# Patient Record
Sex: MALE | Race: WHITE | Employment: OTHER | URBAN - METROPOLITAN AREA
[De-identification: names, ages, dates, MRNs, and addresses within clinical notes are randomized per-mention and may not be internally consistent; named-entity substitution may affect disease eponyms.]

---

## 2019-03-06 ENCOUNTER — OFFICE VISIT (OUTPATIENT)
Dept: SURGERY | Age: 64
End: 2019-03-06

## 2019-03-06 VITALS
BODY MASS INDEX: 22.08 KG/M2 | OXYGEN SATURATION: 100 % | WEIGHT: 163 LBS | HEART RATE: 75 BPM | HEIGHT: 72 IN | SYSTOLIC BLOOD PRESSURE: 155 MMHG | TEMPERATURE: 97.6 F | DIASTOLIC BLOOD PRESSURE: 99 MMHG

## 2019-03-06 DIAGNOSIS — K40.90 RIGHT INGUINAL HERNIA: Primary | ICD-10-CM

## 2019-03-06 DIAGNOSIS — R10.31 RIGHT GROIN PAIN: ICD-10-CM

## 2019-03-06 NOTE — PROGRESS NOTES
General Surgery Consult      Shanika Fox  Admit date: (Not on file)    MRN: [id number]     : 1955     Age: 70 y.o. Attending Physician: Michel Jimenes MD, [facility]      History of Present Illness:     Shanika Fox is a 70 y.o. male was referred for evaluation of a symptomatic right inguinal hernia. The patient has stated that he noticed a bulge about 2-3 months ago. However he stated that he has been having some burning sensation in the right groin since the summer 2017. He is stating that he has been doing a lot of heavy lifting because of his home renovation. He said that he was having some discomfort as well as pain especially with movement. He stated that the bulge is reducible. He denies any nausea or vomiting or any change in bowel habits. He stated that he has frequent urination at night and he had a prostate problem about 6 years ago. He denies any previous abdominal surgeries. Patient Active Problem List    Diagnosis Date Noted    Family hx of prostate cancer 2011     Past Medical History:   Diagnosis Date    Arthritis       History reviewed. No pertinent surgical history. Social History     Tobacco Use    Smoking status: Never Smoker    Smokeless tobacco: Never Used   Substance Use Topics    Alcohol use: No     Frequency: Never      Social History     Tobacco Use   Smoking Status Never Smoker   Smokeless Tobacco Never Used     History reviewed. No pertinent family history. Current Outpatient Medications   Medication Sig    aspirin delayed-release (ASPIR-81) 81 mg tablet Take  by mouth daily.  ibuprofen (MOTRIN) 100 mg tablet Take 100 mg by mouth every six (6) hours as needed.  aspirin (ASPIRIN) 325 mg tablet Take 325 mg by mouth daily. No current facility-administered medications for this visit.        No Known Allergies     Review of Systems:  Constitutional: negative  Eyes: negative  Ears, Nose, Mouth, Throat, and Face: negative  Respiratory: negative  Cardiovascular: negative  Gastrointestinal: positive for abdominal pain and Right groin pain and bulge  Genitourinary:negative  Integument/Breast: negative  Hematologic/Lymphatic: negative  Musculoskeletal:negative  Neurological: negative  Behavioral/Psychiatric: negative  Endocrine: negative  Allergic/Immunologic: negative    Objective:     Visit Vitals  BP (!) 155/99 (BP Patient Position: Sitting)   Pulse 75   Temp 97.6 °F (36.4 °C) (Oral)   Ht 6' (1.829 m)   Wt 73.9 kg (163 lb)   SpO2 100%   BMI 22.11 kg/m²       Physical Exam:      General:  in no apparent distress, alert, oriented times 3 and cooperative   Eyes:  conjunctivae and sclerae normal, pupils equal, round, reactive to light   Throat & Neck: no erythema or exudates noted and neck supple and symmetrical; no palpable masses   Lungs:   clear to auscultation bilaterally   Heart:  Regular rate and rhythm   Abdomen:   flat, soft, nontender, nondistended, no masses or organomegaly. there is a right inguinal hernia that is mildly tender to palpation but easily reducible.     Extremities: extremities normal, atraumatic, no cyanosis or edema   Skin: Normal.       Imaging and Lab Review:     CBC: No results found for: WBC, RBC, HGB, HCT, PLT, HGBEXT, HCTEXT, PLTEXT  BMP:   Lab Results   Component Value Date/Time    Glucose 92 04/28/2011 11:00 AM    Sodium 136 04/28/2011 11:00 AM    Potassium 4.3 04/28/2011 11:00 AM    Chloride 99 04/28/2011 11:00 AM    CO2 26 04/28/2011 11:00 AM    BUN 17 04/28/2011 11:00 AM    Creatinine 0.90 04/28/2011 11:00 AM    Calcium 9.4 04/28/2011 11:00 AM     CMP:  Lab Results   Component Value Date/Time    Glucose 92 04/28/2011 11:00 AM    Sodium 136 04/28/2011 11:00 AM    Potassium 4.3 04/28/2011 11:00 AM    Chloride 99 04/28/2011 11:00 AM    CO2 26 04/28/2011 11:00 AM    BUN 17 04/28/2011 11:00 AM    Creatinine 0.90 04/28/2011 11:00 AM    Calcium 9.4 04/28/2011 11:00 AM    BUN/Creatinine ratio 19 04/28/2011 11:00 AM       No results found for this or any previous visit (from the past 24 hour(s)). images and reports reviewed    Assessment:   Yamilex Nelson is a 70 y.o. male is presenting with a picture of symptomatic right inguinal hernia. I Discussed the possibility of incarceration, strangulation, enlargement in size over time, and the risk of emergency surgery in the face of strangulation. I also discussed the use of prosthetic materials (mesh), including the risk of infection. Also discussed the risk of surgery including recurrence and the possible need for reoperation and removal of mesh if used, possibility of postoperative small bowel injury, obstruction or ileus, and the risks of general anesthetic. I explained to the the patient about the robotic hernia repair procedure. Plan:     1. Schedule for robotic right inguinal hernia repair with placement of mesh. 2. No heavy lifting for 2 weeks after the surgery (More than 15 pounds)  3. Avoid constipation by taking stool softener.      Please call me if you have any questions (cell phone: [phone])     Signed By: Jesika Rendon MD     March 6, 2019

## 2019-03-06 NOTE — PATIENT INSTRUCTIONS
If you have any questions or concerns about today's appointment, the verbal and/or written instructions you were given for follow up care, please call our office at [phone]. [facility] [place]  [address]  [place], [address]    [phone] office  [phone] fax      PATIENT PRE AND POST [address]   [address]  [address]  [phone]    Before Surgery Instructions:   1) You must have someone available to drive you to and from your procedure and stay with you for the first 24 hours. 2) It is very important that you have nothing to eat or drink after midnight the night before your surgery. This includes chewing gum or sucking on hard candy. Take only heart, blood pressure and cholesterol medications the morning of surgery with only a sip of water. 3) Please stop taking Plavix 10 days prior to your surgery. Stop taking Coumadin 5 days prior to your surgery. Stop taking all Aspirin or Aspirin containing products 7 days prior to your surgery. Stop taking Advil, Motrin, Aleve, and etc. 3 days prior to your surgery. 4) If you take any diabetic medications please consult with your primary care physician on how to take them on the day of your surgery  5) Please stop all Herbal products 2 weeks prior to your surgery. 6) Please arrive at the hospital 2 hours prior to your surgery, unless you have been otherwise instructed. Any required labs/urine drug screen/x-rays will be performed on day of surgery. 7) If you are of child bearing age you will have pregnancy test done the morning of your surgery as soon as you arrive. 8) Patients having an operation on their colon will be given a separate instruction sheet on their Bowel Prep. 9) For any pre-operative work up check in at the main entrance to \[facility]\"", and then go to Patient Registration.  These studies are done on a walk in basis they are open from 7:00am to 5:00pm Monday through Friday. 10) Please wash your surgical site the morning of your surgery with antibacterial (i.e. Dial) soap and water. 11) You will be contacted by a hospital preadmission nurse approximately one week prior to scheduled surgery to discuss additional instructions and to review your medications. 12) You may be contacted to change your surgery time. At times this is necessary due to equipment, staffing needs or in the event of a cancellation. Surgery Date and Time:  Thursday, March 14, 2019 at 12:30pm    Please check in at [facility], enter through the Emergency Room entrance and go up to the second floor. Please check in by 10:30am the day of your surgery. After Surgery Instructions: You will need to be seen in the office for a follow-up visit 7-14 days after your surgery. Please call after you have had the procedure to make this appointment. Unless otherwise instructed, you may remove your outer bandage and shower 48 hours after your surgery. If you develop a fever greater than 101, have any significant drainage, bleeding, swelling and/or pus of the wound. Please call our office immediately. You may contact Vera Christiansen with any questions at [phone].

## 2019-03-06 NOTE — PROGRESS NOTES
Jerad Saunders is a 70 y.o. male (: 1955) presenting to address:    Chief Complaint   Patient presents with    Inguinal Hernia     right sided        Medication list and allergies have been reviewed with Jerad Saunders and updated as of today's date. I have gone over all Medical, Surgical and Social History with Jerad Saunders and updated/added the information accordingly. Pt reports that he is very active with a house that he has had built and I remodeling his old house and believes that he has over strained his groin and caused this hernia. He does not have a PCP and self referred to us. 1. Have you been to the ER, urgent care clinic since your last visit? Hospitalized since your last visit? No    2. Have you seen or consulted any other health care providers outside of the [address] [place] [address] since your last visit? Include any pap smears or colon screening.  No

## 2019-03-12 NOTE — PERIOP NOTES
PAT - SURGICAL PRE-ADMISSION INSTRUCTIONS    NAME:  Sanchez Jimenez                                                          TODAY'S DATE:  3/12/2019    SURGERY DATE:  3/14/2019                                  SURGERY ARRIVAL TIME:   TBA    1. Do NOT eat or drink anything, including candy or gum, after MIDNIGHT on 3/13/19 , unless you have specific instructions from your Surgeon or Anesthesia Provider to do so. 2. No smoking on the day of surgery. 3. No alcohol 24 hours prior to the day of surgery. 4. No recreational drugs for one week prior to the day of surgery. 5. Leave all valuables, including money/purse, at home. 6. Remove all jewelry, nail polish, makeup (including mascara); no lotions, powders, deodorant, or perfume/cologne/after shave. 7. Glasses/Contact lenses and Dentures may be worn to the hospital.  They will be removed prior to surgery. 8. Call your doctor if symptoms of a cold or illness develop within 24 ours prior to surgery. 9. AN ADULT MUST DRIVE YOU HOME AFTER OUTPATIENT SURGERY. 10. If you are having an OUTPATIENT procedure, please make arrangements for a responsible adult to be with you for 24 hours after your surgery. 11. If you are admitted to the hospital, you will be assigned to a bed after surgery is complete. Normally a family member will not be able to see you until you are in your assigned bed. 15. Family is encouraged to accompany you to the hospital.  We ask visitors in the treatment area to be limited to ONE person at a time to ensure patient privacy. EXCEPTIONS WILL BE MADE AS NEEDED. 15. Children under 12 are discouraged from entering the treatment area and need to be supervised by an adult when in the waiting room. Special Instructions:    NONE. Patient Prep:    shower with anti-bacterial soap    These surgical instructions were reviewed with PATIENT during the PAT PHONE CALL. Directions:   On the morning of surgery, please go to the [facility]. Enter the building from the [facility] entrance, 1st floor (next to the Emergency Room entrance). Take the elevator to the 2nd floor. Sign in at the Registration Desk.     If you have any questions and/or concerns, please do not hesitate to call:  (Prior to the day of surgery)  [facility] unit:  [phone]  (Day of surgery)  [facility] unit:  [phone]

## 2019-03-13 ENCOUNTER — ANESTHESIA EVENT (OUTPATIENT)
Dept: SURGERY | Age: 64
End: 2019-03-13
Payer: MEDICARE

## 2019-03-14 ENCOUNTER — HOSPITAL ENCOUNTER (OUTPATIENT)
Age: 64
Setting detail: OUTPATIENT SURGERY
Discharge: HOME | End: 2019-03-14
Attending: SURGERY | Admitting: SURGERY
Payer: MEDICARE

## 2019-03-14 ENCOUNTER — ANESTHESIA (OUTPATIENT)
Dept: SURGERY | Age: 64
End: 2019-03-14
Payer: MEDICARE

## 2019-03-14 VITALS
DIASTOLIC BLOOD PRESSURE: 79 MMHG | RESPIRATION RATE: 16 BRPM | HEART RATE: 53 BPM | TEMPERATURE: 97.3 F | WEIGHT: 160 LBS | SYSTOLIC BLOOD PRESSURE: 157 MMHG | OXYGEN SATURATION: 100 % | HEIGHT: 70 IN | BODY MASS INDEX: 22.9 KG/M2

## 2019-03-14 DIAGNOSIS — Z87.19 S/P HERNIA REPAIR: Primary | ICD-10-CM

## 2019-03-14 DIAGNOSIS — Z98.890 S/P HERNIA REPAIR: Primary | ICD-10-CM

## 2019-03-14 LAB
ATRIAL RATE: 54 BPM
CALCULATED P AXIS, ECG09: 45 DEGREES
CALCULATED R AXIS, ECG10: -12 DEGREES
CALCULATED T AXIS, ECG11: 18 DEGREES
DIAGNOSIS, 93000: NORMAL
P-R INTERVAL, ECG05: 142 MS
Q-T INTERVAL, ECG07: 406 MS
QRS DURATION, ECG06: 84 MS
QTC CALCULATION (BEZET), ECG08: 385 MS
VENTRICULAR RATE, ECG03: 54 BPM

## 2019-03-14 PROCEDURE — 77030002933 HC SUT MCRYL J&J -A: Performed by: SURGERY

## 2019-03-14 PROCEDURE — 77030035277 HC OBTRTR BLDELSS DISP INTU -B: Performed by: SURGERY

## 2019-03-14 PROCEDURE — 74011000250 HC RX REV CODE- 250: Performed by: SURGERY

## 2019-03-14 PROCEDURE — 74011000250 HC RX REV CODE- 250

## 2019-03-14 PROCEDURE — 77030032490 HC SLV COMPR SCD KNE COVD -B: Performed by: SURGERY

## 2019-03-14 PROCEDURE — 74011250636 HC RX REV CODE- 250/636: Performed by: SURGERY

## 2019-03-14 PROCEDURE — 74011250637 HC RX REV CODE- 250/637: Performed by: NURSE ANESTHETIST, CERTIFIED REGISTERED

## 2019-03-14 PROCEDURE — 74011250636 HC RX REV CODE- 250/636: Performed by: NURSE ANESTHETIST, CERTIFIED REGISTERED

## 2019-03-14 PROCEDURE — 77030020703 HC SEAL CANN DISP INTU -B: Performed by: SURGERY

## 2019-03-14 PROCEDURE — 77030018842 HC SOL IRR SOD CL 9% BAXT -A: Performed by: SURGERY

## 2019-03-14 PROCEDURE — 76210000021 HC REC RM PH II 0.5 TO 1 HR: Performed by: SURGERY

## 2019-03-14 PROCEDURE — 76060000032 HC ANESTHESIA 0.5 TO 1 HR: Performed by: SURGERY

## 2019-03-14 PROCEDURE — C1781 MESH (IMPLANTABLE): HCPCS | Performed by: SURGERY

## 2019-03-14 PROCEDURE — 76210000017 HC OR PH I REC 1.5 TO 2 HR: Performed by: SURGERY

## 2019-03-14 PROCEDURE — 93005 ELECTROCARDIOGRAM TRACING: CPT

## 2019-03-14 PROCEDURE — 77030010507 HC ADH SKN DERMBND J&J -B: Performed by: SURGERY

## 2019-03-14 PROCEDURE — 74011000272 HC RX REV CODE- 272: Performed by: SURGERY

## 2019-03-14 PROCEDURE — 76010000933 HC OR TIME 0.5 TO 1HR INTENSV - TIER 2: Performed by: SURGERY

## 2019-03-14 PROCEDURE — 74011250636 HC RX REV CODE- 250/636

## 2019-03-14 PROCEDURE — 77030031139 HC SUT VCRL2 J&J -A: Performed by: SURGERY

## 2019-03-14 DEVICE — LAPAROSCOPIC SELF-FIXATING MESH POLYESTER WITH POLYLACTIC ACID GRIPS AND COLLAGEN FILM
Type: IMPLANTABLE DEVICE | Site: INGUINAL | Status: FUNCTIONAL
Brand: PROGRIP

## 2019-03-14 RX ORDER — FENTANYL CITRATE 50 UG/ML
25 INJECTION, SOLUTION INTRAMUSCULAR; INTRAVENOUS AS NEEDED
Status: DISCONTINUED | OUTPATIENT
Start: 2019-03-14 | End: 2019-03-14 | Stop reason: HOSPADM

## 2019-03-14 RX ORDER — HYDROMORPHONE HYDROCHLORIDE 2 MG/ML
0.5 INJECTION, SOLUTION INTRAMUSCULAR; INTRAVENOUS; SUBCUTANEOUS
Status: DISCONTINUED | OUTPATIENT
Start: 2019-03-14 | End: 2019-03-14 | Stop reason: HOSPADM

## 2019-03-14 RX ORDER — BUPIVACAINE HYDROCHLORIDE AND EPINEPHRINE 5; 5 MG/ML; UG/ML
INJECTION, SOLUTION EPIDURAL; INTRACAUDAL; PERINEURAL AS NEEDED
Status: DISCONTINUED | OUTPATIENT
Start: 2019-03-14 | End: 2019-03-14 | Stop reason: HOSPADM

## 2019-03-14 RX ORDER — PROPOFOL 10 MG/ML
INJECTION, EMULSION INTRAVENOUS AS NEEDED
Status: DISCONTINUED | OUTPATIENT
Start: 2019-03-14 | End: 2019-03-14 | Stop reason: HOSPADM

## 2019-03-14 RX ORDER — KETOROLAC TROMETHAMINE 30 MG/ML
INJECTION, SOLUTION INTRAMUSCULAR; INTRAVENOUS AS NEEDED
Status: DISCONTINUED | OUTPATIENT
Start: 2019-03-14 | End: 2019-03-14 | Stop reason: HOSPADM

## 2019-03-14 RX ORDER — INSULIN LISPRO 100 [IU]/ML
INJECTION, SOLUTION INTRAVENOUS; SUBCUTANEOUS ONCE
Status: DISCONTINUED | OUTPATIENT
Start: 2019-03-14 | End: 2019-03-14 | Stop reason: HOSPADM

## 2019-03-14 RX ORDER — ONDANSETRON 2 MG/ML
4 INJECTION INTRAMUSCULAR; INTRAVENOUS ONCE
Status: COMPLETED | OUTPATIENT
Start: 2019-03-14 | End: 2019-03-14

## 2019-03-14 RX ORDER — FAMOTIDINE 20 MG/1
20 TABLET, FILM COATED ORAL ONCE
Status: COMPLETED | OUTPATIENT
Start: 2019-03-14 | End: 2019-03-14

## 2019-03-14 RX ORDER — HYDROMORPHONE HYDROCHLORIDE 1 MG/ML
INJECTION, SOLUTION INTRAMUSCULAR; INTRAVENOUS; SUBCUTANEOUS AS NEEDED
Status: DISCONTINUED | OUTPATIENT
Start: 2019-03-14 | End: 2019-03-14 | Stop reason: HOSPADM

## 2019-03-14 RX ORDER — OXYCODONE AND ACETAMINOPHEN 5; 325 MG/1; MG/1
1 TABLET ORAL
Qty: 24 TAB | Refills: 0 | Status: SHIPPED | OUTPATIENT
Start: 2019-03-14 | End: 2019-03-17

## 2019-03-14 RX ORDER — SODIUM CHLORIDE 0.9 % (FLUSH) 0.9 %
5-40 SYRINGE (ML) INJECTION AS NEEDED
Status: DISCONTINUED | OUTPATIENT
Start: 2019-03-14 | End: 2019-03-14 | Stop reason: HOSPADM

## 2019-03-14 RX ORDER — SUCCINYLCHOLINE CHLORIDE 20 MG/ML
INJECTION INTRAMUSCULAR; INTRAVENOUS AS NEEDED
Status: DISCONTINUED | OUTPATIENT
Start: 2019-03-14 | End: 2019-03-14 | Stop reason: HOSPADM

## 2019-03-14 RX ORDER — NEOSTIGMINE METHYLSULFATE 5 MG/5 ML
SYRINGE (ML) INTRAVENOUS AS NEEDED
Status: DISCONTINUED | OUTPATIENT
Start: 2019-03-14 | End: 2019-03-14 | Stop reason: HOSPADM

## 2019-03-14 RX ORDER — ONDANSETRON 2 MG/ML
INJECTION INTRAMUSCULAR; INTRAVENOUS AS NEEDED
Status: DISCONTINUED | OUTPATIENT
Start: 2019-03-14 | End: 2019-03-14 | Stop reason: HOSPADM

## 2019-03-14 RX ORDER — DEXAMETHASONE SODIUM PHOSPHATE 4 MG/ML
INJECTION, SOLUTION INTRA-ARTICULAR; INTRALESIONAL; INTRAMUSCULAR; INTRAVENOUS; SOFT TISSUE AS NEEDED
Status: DISCONTINUED | OUTPATIENT
Start: 2019-03-14 | End: 2019-03-14 | Stop reason: HOSPADM

## 2019-03-14 RX ORDER — LIDOCAINE HYDROCHLORIDE 20 MG/ML
INJECTION, SOLUTION EPIDURAL; INFILTRATION; INTRACAUDAL; PERINEURAL AS NEEDED
Status: DISCONTINUED | OUTPATIENT
Start: 2019-03-14 | End: 2019-03-14 | Stop reason: HOSPADM

## 2019-03-14 RX ORDER — SODIUM CHLORIDE, SODIUM LACTATE, POTASSIUM CHLORIDE, CALCIUM CHLORIDE 600; 310; 30; 20 MG/100ML; MG/100ML; MG/100ML; MG/100ML
50 INJECTION, SOLUTION INTRAVENOUS CONTINUOUS
Status: DISCONTINUED | OUTPATIENT
Start: 2019-03-14 | End: 2019-03-14 | Stop reason: HOSPADM

## 2019-03-14 RX ORDER — VECURONIUM BROMIDE FOR INJECTION 1 MG/ML
INJECTION, POWDER, LYOPHILIZED, FOR SOLUTION INTRAVENOUS AS NEEDED
Status: DISCONTINUED | OUTPATIENT
Start: 2019-03-14 | End: 2019-03-14 | Stop reason: HOSPADM

## 2019-03-14 RX ORDER — MIDAZOLAM HYDROCHLORIDE 1 MG/ML
INJECTION, SOLUTION INTRAMUSCULAR; INTRAVENOUS AS NEEDED
Status: DISCONTINUED | OUTPATIENT
Start: 2019-03-14 | End: 2019-03-14 | Stop reason: HOSPADM

## 2019-03-14 RX ORDER — CEFAZOLIN SODIUM 2 G/50ML
2 SOLUTION INTRAVENOUS
Status: COMPLETED | OUTPATIENT
Start: 2019-03-14 | End: 2019-03-14

## 2019-03-14 RX ORDER — FENTANYL CITRATE 50 UG/ML
INJECTION, SOLUTION INTRAMUSCULAR; INTRAVENOUS AS NEEDED
Status: DISCONTINUED | OUTPATIENT
Start: 2019-03-14 | End: 2019-03-14 | Stop reason: HOSPADM

## 2019-03-14 RX ORDER — SODIUM CHLORIDE 0.9 % (FLUSH) 0.9 %
5-40 SYRINGE (ML) INJECTION EVERY 8 HOURS
Status: DISCONTINUED | OUTPATIENT
Start: 2019-03-14 | End: 2019-03-14 | Stop reason: HOSPADM

## 2019-03-14 RX ORDER — GLYCOPYRROLATE 0.2 MG/ML
INJECTION INTRAMUSCULAR; INTRAVENOUS AS NEEDED
Status: DISCONTINUED | OUTPATIENT
Start: 2019-03-14 | End: 2019-03-14 | Stop reason: HOSPADM

## 2019-03-14 RX ADMIN — DEXAMETHASONE SODIUM PHOSPHATE 4 MG: 4 INJECTION, SOLUTION INTRA-ARTICULAR; INTRALESIONAL; INTRAMUSCULAR; INTRAVENOUS; SOFT TISSUE at 13:07

## 2019-03-14 RX ADMIN — LIDOCAINE HYDROCHLORIDE 60 MG: 20 INJECTION, SOLUTION EPIDURAL; INFILTRATION; INTRACAUDAL; PERINEURAL at 12:55

## 2019-03-14 RX ADMIN — Medication 3 MG: at 13:25

## 2019-03-14 RX ADMIN — GLYCOPYRROLATE 0.4 MG: 0.2 INJECTION INTRAMUSCULAR; INTRAVENOUS at 13:25

## 2019-03-14 RX ADMIN — CEFAZOLIN SODIUM 2 G: 2 SOLUTION INTRAVENOUS at 12:57

## 2019-03-14 RX ADMIN — HYDROMORPHONE HYDROCHLORIDE 1 MG: 1 INJECTION, SOLUTION INTRAMUSCULAR; INTRAVENOUS; SUBCUTANEOUS at 13:00

## 2019-03-14 RX ADMIN — MIDAZOLAM HYDROCHLORIDE 2 MG: 1 INJECTION, SOLUTION INTRAMUSCULAR; INTRAVENOUS at 12:47

## 2019-03-14 RX ADMIN — FENTANYL CITRATE 100 MCG: 50 INJECTION, SOLUTION INTRAMUSCULAR; INTRAVENOUS at 12:55

## 2019-03-14 RX ADMIN — ONDANSETRON 4 MG: 2 INJECTION INTRAMUSCULAR; INTRAVENOUS at 13:09

## 2019-03-14 RX ADMIN — KETOROLAC TROMETHAMINE 30 MG: 30 INJECTION, SOLUTION INTRAMUSCULAR; INTRAVENOUS at 13:24

## 2019-03-14 RX ADMIN — SODIUM CHLORIDE, SODIUM LACTATE, POTASSIUM CHLORIDE, AND CALCIUM CHLORIDE: 600; 310; 30; 20 INJECTION, SOLUTION INTRAVENOUS at 13:33

## 2019-03-14 RX ADMIN — SUCCINYLCHOLINE CHLORIDE 100 MG: 20 INJECTION INTRAMUSCULAR; INTRAVENOUS at 12:56

## 2019-03-14 RX ADMIN — FENTANYL CITRATE 25 MCG: 50 INJECTION, SOLUTION INTRAMUSCULAR; INTRAVENOUS at 14:01

## 2019-03-14 RX ADMIN — VECURONIUM BROMIDE FOR INJECTION 3 MG: 1 INJECTION, POWDER, LYOPHILIZED, FOR SOLUTION INTRAVENOUS at 12:58

## 2019-03-14 RX ADMIN — FAMOTIDINE 20 MG: 20 TABLET ORAL at 11:01

## 2019-03-14 RX ADMIN — SODIUM CHLORIDE, SODIUM LACTATE, POTASSIUM CHLORIDE, AND CALCIUM CHLORIDE 50 ML/HR: 600; 310; 30; 20 INJECTION, SOLUTION INTRAVENOUS at 11:02

## 2019-03-14 RX ADMIN — ONDANSETRON 4 MG: 2 INJECTION INTRAMUSCULAR; INTRAVENOUS at 14:09

## 2019-03-14 RX ADMIN — PROPOFOL 150 MG: 10 INJECTION, EMULSION INTRAVENOUS at 12:55

## 2019-03-14 NOTE — DISCHARGE INSTRUCTIONS
Discharge Instructions Following Surgery    Patient: Je Beck MRN: [id number]  SSN: [id number]    YOB: 1955  Age: 75 y.o. Sex: male      Activity  · As tolerated, walking encourage, stairs are okay. · Avoid strenuous activities - no lifting anything heavier than 15 pounds till seen in the clinic. · You may shower at home after 48 hours. Diet  · Regular diet after nausea from the anesthetic has passed. Pain  · Take pain medication as directed by your doctor. Please add Aleve or Ibuprofen as needed (If no contraindications for these types of medications). ·  Call your doctor if pain is NOT relieved by medication. Dressing Care  · There is glue on the wounds. No need for any dressing care. After Anesthesia  · For the first 24 hours: DO NOT Drive, Drink alcoholic beverages, or Make important decisions. · Be aware of dizziness following anesthesia and while taking pain medication. Call your doctor if  · Excessive bleeding that does not stop after holding mild pressure over the area. · Temperature of 101 degrees F or above. · Redness,excessive swelling or bruising, and/or green or yellow, smelly discharge from incision. · If nausea and vomiting continues. Appointment date/time Follow-Up Phone Calls    · Call the office at [phone] to make your follow-up appointment in 2 weeks after the surgery (if not already set up) . Dr. Tino Franco cell phone number is [phone]. Please call me if you have any concerns or questions. DISCHARGE SUMMARY from Nurse    PATIENT INSTRUCTIONS:    After general anesthesia or intravenous sedation, for 24 hours or while taking prescription Narcotics:  · Limit your activities  · Do not drive and operate hazardous machinery  · Do not make important personal or business decisions  · Do  not drink alcoholic beverages  · If you have not urinated within 8 hours after discharge, please contact your surgeon on call.     Report the following to your surgeon:  · Excessive pain, swelling, redness or odor of or around the surgical area  · Temperature over 100.5  · Nausea and vomiting lasting longer than 4 hours or if unable to take medications  · Any signs of decreased circulation or nerve impairment to extremity: change in color, persistent  numbness, tingling, coldness or increase pain  · Any questions    What to do at Home:  Recommended activity: {discharge activity:17712}, ***    If you experience any of the following symptoms ***, please follow up with ***. *  Please give a list of your current medications to your Primary Care Provider. *  Please update this list whenever your medications are discontinued, doses are      changed, or new medications (including over-the-counter products) are added. *  Please carry medication information at all times in case of emergency situations. These are general instructions for a healthy lifestyle:    No smoking/ No tobacco products/ Avoid exposure to second hand smoke  Surgeon General's Warning:  Quitting smoking now greatly reduces serious risk to your health. Obesity, smoking, and sedentary lifestyle greatly increases your risk for illness    A healthy diet, regular physical exercise & weight monitoring are important for maintaining a healthy lifestyle    You may be retaining fluid if you have a history of heart failure or if you experience any of the following symptoms:  Weight gain of 3 pounds or more overnight or 5 pounds in a week, increased swelling in our hands or feet or shortness of breath while lying flat in bed. Please call your doctor as soon as you notice any of these symptoms; do not wait until your next office visit.     Recognize signs and symptoms of STROKE:    F-face looks uneven    A-arms unable to move or move unevenly    S-speech slurred or non-existent    T-time-call 911 as soon as signs and symptoms begin-DO NOT go       Back to bed or wait to see if you get better-TIME IS BRAIN. Warning Signs of HEART ATTACK     Call 911 if you have these symptoms:   Chest discomfort. Most heart attacks involve discomfort in the center of the chest that lasts more than a few minutes, or that goes away and comes back. It can feel like uncomfortable pressure, squeezing, fullness, or pain.  Discomfort in other areas of the upper body. Symptoms can include pain or discomfort in one or both arms, the back, neck, jaw, or stomach.  Shortness of breath with or without chest discomfort.  Other signs may include breaking out in a cold sweat, nausea, or lightheadedness. Don't wait more than five minutes to call 911 - MINUTES MATTER! Fast action can save your life. Calling 911 is almost always the fastest way to get lifesaving treatment. Emergency Medical Services staff can begin treatment when they arrive -- up to an hour sooner than if someone gets to the hospital by car. The discharge information has been reviewed with the {PATIENT PARENT GUARDIAN:23934}. The {PATIENT PARENT GUARDIAN:79748} verbalized understanding. Discharge medications reviewed with the {Dishcarge meds reviewed ZHXI:04586} and appropriate educational materials and side effects teaching were provided.   ___________________________________________________________________________________________________________________________________  Patient {UIERDKVC:90426}

## 2019-03-14 NOTE — PERIOP NOTES
Phase 2 Recovery Summary  Patient arrived to Phase 2 at   Report received from [facility]:    03/14/19 1411 03/14/19 1416 03/14/19 1443 03/14/19 1524   BP: 161/81 165/80 156/78 157/79   Pulse: (!) 50 (!) 46 (!) 48 (!) 53   Resp: 10 8 10 16   Temp:       SpO2: 100% 96% 100%    Weight:       Height:           oriented to time, place, person and situation    Lines and Drains  Peripheral Intravenous Line:      Wound  Wound Abdomen (Active)   Number of days: 0       Wound Abdomen Anterior (Active)   Dressing Status  Clean, dry, and intact 3/14/2019  3:39 PM   Dressing Type  Topical skin adhesive/glue 3/14/2019  3:39 PM   Incision site well approximated?  Yes 3/14/2019  3:39 PM   Number of days: 0          Patient discharged to home with friend    Lotus Espinosa RN

## 2019-03-14 NOTE — BRIEF OP NOTE
BRIEF OPERATIVE NOTE    Date of Procedure: 3/14/2019   Preoperative Diagnosis: Right Inguinal Hernis K40.90 (R) Groin Pain R10.31  Postoperative Diagnosis: Right Inguinal Hernis K40.90 (R) Groin Pain R10.31    Procedure(s):  rOBOTIC REPAIR OF RIGHT INGUINAL HERNIA WITH MESH  Surgeon(s) and Role:     * Tad Manuel MD - Primary  Surgical Staff:  Circ-1: Eustacio Kussmaul, RN  Circ-2: Annamarie Jose  Scrub Tech-1: Humble VÁSQUEZ  Scrub Tech-2: Chelo CHOUDHARY  Surg Asst-1: Lyle Awad  Event Time In Time Out   Incision Start 1300    Incision Close       Anesthesia: General   Estimated Blood Loss: Minimal.  Specimens: * No specimens in log *   Findings: Right indirect inguinal hernia. Complications: None. Implants:   Implant Name Type Inv.  Item Serial No.  Lot No. LRB No. Used Action   MESH ABSORB SURG PROGRIP 10X15 -- PROGRIP - [id number]  MESH ABSORB SURG PROGRIP 10X15 -- PROGRIP  COVID[employer]EN  SURGICAL [id number] Right 1 Implanted

## 2019-03-14 NOTE — PERIOP NOTES
Pre-Op Summary    Pt arrived via car with family/friend and is oriented to time, place, person and situation. Patient with steady gait with none assistive devices. Visit Vitals  /87   Pulse 65   Temp 98.4 °F (36.9 °C)   Resp 16   Ht 5' 9.75\" (1.772 m)   Wt 72.6 kg (160 lb)   SpO2 98%   BMI 23.12 kg/m²       Peripheral IV located on Right hand . Patients belongings are located locked in store room. Patient's point of contact is Poli Mcdonald and their contact number is: [phone]. They will be leaving and coming back. They are able to receive medication information. They will be their ride home.

## 2019-03-14 NOTE — PERIOP NOTES
1346 Pt received to PACU and connected to monitor. Bedside report given by Keren Gutiérrez RN. Vital signs stable. Nurse at bedside. Will continue to monitor.

## 2019-03-14 NOTE — ANESTHESIA PREPROCEDURE EVALUATION
Anesthetic History   No history of anesthetic complications            Review of Systems / Medical History  Patient summary reviewed and pertinent labs reviewed    Pulmonary                   Neuro/Psych              Cardiovascular                       GI/Hepatic/Renal                Endo/Other        Arthritis     Other Findings              Physical Exam    Airway  Mallampati: III  TM Distance: 4 - 6 cm  Neck ROM: decreased range of motion   Mouth opening: Diminished (comment)     Cardiovascular    Rhythm: regular  Rate: normal         Dental    Dentition: Poor dentition     Pulmonary  Breath sounds clear to auscultation               Abdominal  GI exam deferred       Other Findings            Anesthetic Plan    ASA: 1  Anesthesia type: general          Induction: Intravenous  Anesthetic plan and risks discussed with: Patient

## 2019-03-14 NOTE — PROGRESS NOTES
Date of Surgery Update:  Marialuisa Clemons was seen and examined. History and physical has been reviewed. The patient has been examined. There have been no significant clinical changes since the completion of the originally dated History and Physical. Will proceed with robotic right inguinal hernia repair with mesh.      Signed By: Emma Velazquez MD     March 14, 2019 10:53 AM

## 2019-03-14 NOTE — ANESTHESIA POSTPROCEDURE EVALUATION
Procedure(s):  rOBOTIC REPAIR OF RIGHT INGUINAL HERNIA WITH MESH.     Anesthesia Post Evaluation      Multimodal analgesia: multimodal analgesia used between 6 hours prior to anesthesia start to PACU discharge  Patient location during evaluation: bedside  Patient participation: complete - patient participated  Level of consciousness: awake  Pain management: adequate  Airway patency: patent  Anesthetic complications: no  Cardiovascular status: stable  Respiratory status: acceptable  Hydration status: acceptable  Post anesthesia nausea and vomiting:  controlled      Visit Vitals  /80   Pulse (!) 46   Temp 36.3 °C (97.3 °F)   Resp 8   Ht 5' 9.75\" (1.772 m)   Wt 72.6 kg (160 lb)   SpO2 96%   BMI 23.12 kg/m²

## 2019-03-15 ENCOUNTER — TELEPHONE (OUTPATIENT)
Dept: SURGERY | Age: 64
End: 2019-03-15

## 2019-03-15 ENCOUNTER — HOSPITAL ENCOUNTER (EMERGENCY)
Age: 64
Discharge: HOME | End: 2019-03-15
Attending: EMERGENCY MEDICINE
Payer: MEDICARE

## 2019-03-15 VITALS
SYSTOLIC BLOOD PRESSURE: 129 MMHG | BODY MASS INDEX: 22.9 KG/M2 | HEIGHT: 70 IN | DIASTOLIC BLOOD PRESSURE: 72 MMHG | WEIGHT: 160 LBS | RESPIRATION RATE: 18 BRPM | HEART RATE: 74 BPM | TEMPERATURE: 98.1 F | OXYGEN SATURATION: 99 %

## 2019-03-15 DIAGNOSIS — R33.9 URINARY RETENTION: Primary | ICD-10-CM

## 2019-03-15 PROCEDURE — 77030034850

## 2019-03-15 PROCEDURE — 99284 EMERGENCY DEPT VISIT MOD MDM: CPT

## 2019-03-15 PROCEDURE — 77030029179 HC BAG URIN DRNG SIMS -A

## 2019-03-15 PROCEDURE — 51702 INSERT TEMP BLADDER CATH: CPT

## 2019-03-15 NOTE — ED PROVIDER NOTES
EMERGENCY DEPARTMENT HISTORY AND PHYSICAL EXAM    6:51 AM      Date: 3/15/2019  Patient Name: Yayo Rodrigez    History of Presenting Illness     Chief Complaint   Patient presents with    Urinary Retention         History Provided By: patient  Additional History (Context): Yayo Rodrigez is a 75 y.o. male presents with    PCP: None        Surgery yesterday w Dr Sweet Man. Right laparoscopic inguinal hernia repair with mesh. Has not voided since, severe distress and urge to urinate. Tried hot shower. Feeling severe pressure and urge to void. .      Current Outpatient Medications   Medication Sig Dispense Refill    oxyCODONE-acetaminophen (PERCOCET) 5-325 mg per tablet Take 1 Tab by mouth every four (4) hours as needed for Pain for up to 3 days. Max Daily Amount: 6 Tabs. 24 Tab 0       Past History     Past Medical History:  Past Medical History:   Diagnosis Date    Arthritis     Varicose vein of leg     LEFT LEG       Past Surgical History:  Past Surgical History:   Procedure Laterality Date    HX COLONOSCOPY      HX OTHER SURGICAL  1986    sebaceous cyst removed from back       Family History:  History reviewed. No pertinent family history. Social History:  Social History     Tobacco Use    Smoking status: Never Smoker    Smokeless tobacco: Never Used   Substance Use Topics    Alcohol use: Yes     Frequency: Never     Comment: BEER OR WINE OCCAS    Drug use: No       Allergies:  No Known Allergies      Review of Systems     Review of Systems   Constitutional: Negative for diaphoresis and fever. HENT: Negative for congestion and sore throat. Eyes: Negative for pain and itching. Respiratory: Negative for cough and shortness of breath. Cardiovascular: Negative for chest pain and palpitations. Gastrointestinal: Negative for abdominal pain and diarrhea. Endocrine: Negative for polydipsia and polyuria. Genitourinary: Negative for dysuria and hematuria.    Musculoskeletal: Negative for arthralgias and myalgias. Skin: Negative for rash and wound. Neurological: Negative for seizures and syncope. Hematological: Does not bruise/bleed easily. Psychiatric/Behavioral: Negative for agitation and hallucinations. Physical Exam     Patient Vitals for the past 12 hrs:   Temp Pulse Resp BP SpO2   03/15/19 0815    135/73    03/15/19 0634 98.1 °F (36.7 °C) 76 18 (!) 168/114 97 %         Physical Exam   Constitutional: He appears well-developed and well-nourished. HENT:   Head: Normocephalic and atraumatic. Eyes: Conjunctivae are normal. No scleral icterus. Neck: Normal range of motion. Neck supple. No JVD present. Cardiovascular: Normal rate, regular rhythm and intact distal pulses. Pulmonary/Chest: Effort normal. No respiratory distress. Abdominal: There is tenderness (superpubic tenderness). Genitourinary: Testes normal and penis normal.   Musculoskeletal: Normal range of motion. Neurological: He is alert. Skin: Skin is warm and dry. Psychiatric: Judgment and thought content normal.   Nursing note and vitals reviewed. Diagnostic Study Results   Labs -  No results found for this or any previous visit (from the past 12 hour(s)). Radiologic Studies -   No orders to display     No results found. Medications ordered:   Medications - No data to display      Medical Decision Making   Initial Medical Decision Making and DDx:  Nurse attempting patel. ED Course: Progress Notes, Reevaluation, and Consults:     9:18 AM success after Patel catheter. All the patient's symptoms have resolved. Discussed with Dr. Edna Herman and routine follow-up for him. Discussed with patient and will give referral to [facility] [place]. Patel converted to leg bag. 9:19 AM Pt reevaluated at this time. Discussed results and findings, as well as, diagnosis and plan for discharge. Follow up with doctors/services listed. Return to the emergency department for alarming symptoms.   Pt verbalizes understanding and agreement with plan. All questions addressed. I am the first provider for this patient. I reviewed the vital signs, available nursing notes, past medical history, past surgical history, family history and social history. Vital Signs-Reviewed the patient's vital signs. Records Reviewed: Nursing Notes (Time of Review: 6:51 AM)      Diagnosis     Clinical Impression:   1. Urinary retention        Disposition: home    Follow-up Information     Follow up With Specialties Details Why Contact Info    [facility]  In 2 days  [address] [place] [address]  [phone]              Medication List      ASK your doctor about these medications    oxyCODONE-acetaminophen 5-325 mg per tablet  Commonly known as:  PERCOCET  Take 1 Tab by mouth every four (4) hours as needed for Pain for up to 3 days. Max Daily Amount: 6 Tabs.          _______________________________    Attestations:  Billie Gomez MD acting as a scribe for and in the presence of Kristie Negrete MD      March 15, 2019 at 9:19 AM       Provider Attestation:      I personally performed the services described in the documentation, reviewed the documentation, as recorded by the scribe in my presence, and it accurately and completely records my words and actions.  March 15, 2019 at 9:19 AM - Kristie Negrete MD    _______________________________

## 2019-03-15 NOTE — TELEPHONE ENCOUNTER
Saima Antoine called from [facility] ED to speak with Dr. Charmaine Jessica, patient in ED with c/o unable to void. Will speak with Charmaine Jessica, [address] [place] [address].

## 2019-03-15 NOTE — OP NOTES
[facility]  OPERATIVE REPORT    Name:  Niraj Stephenson  MR#:   [id number]  :  1955  ACCOUNT #:  [de-identified]  DATE OF SERVICE:  2019    PREOPERATIVE DIAGNOSIS:  Right inguinal hernia. POSTOPERATIVE DIAGNOSIS:  Right indirect inguinal hernia. PROCEDURE PERFORMED:  Robotic repair of right inguinal hernia with mesh. SURGEON:  Eric Thornton M.D.    ASSISTANT:  Dianne Beck MD    ANESTHESIA:  General.    COMPLICATIONS:  None. SPECIMENS REMOVED:  None. IMPLANTS:  Mesh. ESTIMATED BLOOD LOSS:  Minimal.    PROCEDURE:  The patient was brought to the operating room. Anesthesia was induced. Scrubbing and draping of the abdomen was done in the usual manner. A time-out was performed. A skin incision in the supraumbilical area was performed. Veress needle was inserted. The abdomen was insufflated. An 8 mm port was inserted. The abdomen was explored. There was no evidence of any hernia on the left side. There was a right indirect inguinal hernia on the right side. Two other 8-mm ports were placed under direct visualization on the right and left side of the abdominal wall. The robot was docked. The peritoneum was opened in the right groin. The preperitoneal space was dissected. The inferior epigastric vessels were identified and protected. The sac was completely reduced and the cord structures were identified and protected. The Pravin's ligament was identified. A 15 x 10 cm ProGrip Covidien mesh was placed in the preperitoneal space on the right side with the rough side towards the abdominal wall and smooth side towards the bowel. This was fixed with interrupted 3-0 Vicryl sutures, 3 of them and then the peritoneum was closed on top of the mesh with a 2-0 V-Loc suture. Hemostasis was secured, instruments were removed, robot was undocked, and the skin incisions were closed with 4-0 Monocryl.       Remberto Elizalde MD      [id number]  D:  2019 13:36  T:  03/14/2019 22:19  JOB #:  [id number]

## 2019-03-15 NOTE — ED NOTES
Report given to LEONOR POOLE [facility]. No longer assuming care of patient at this time.

## 2019-03-15 NOTE — DISCHARGE INSTRUCTIONS
Patient Education        Urinary Retention: Care Instructions  Your Care Instructions    Urinary retention means that you aren't able to urinate. In men, it is often caused by a blockage of the urinary tract from an enlarged prostate gland. In men and women, it can also be caused by an infection or nerve damage. Or it may be a side effect of a medicine. The doctor may have drained the urine from your bladder. If you still have problems passing urine, you may need to use a catheter at home. This is used to empty your bladder until the problem can be fixed. Your doctor may put a catheter in your bladder before you go home. If so, he or she will tell you when to come back to have the catheter removed. The doctor has checked you closely. But problems can develop later. If you notice any problems or new symptoms, get medical treatment right away. Follow-up care is a key part of your treatment and safety. Be sure to make and go to all appointments, and call your doctor if you are having problems. It's also a good idea to know your test results and keep a list of the medicines you take. How can you care for yourself at home? · Take your medicines exactly as prescribed. Call your doctor if you think you are having a problem with your medicine. You will get more details on the specific medicines your doctor prescribes. · Check with your doctor before you use any over-the-counter medicines. Many cold and allergy medicines, for example, can make this problem worse. Make sure your doctor knows all of the medicines, vitamins, supplements, and herbal remedies you take. · Spread out through the day the amount of fluid you drink. Do not drink a lot at bedtime. · Avoid alcohol and caffeine. · If you have been given a catheter, or if one is already in place, follow the instructions you were given. Always wash your hands before and after you handle the catheter. When should you call for help?   Call your doctor now or seek immediate medical care if:    · You cannot urinate at all, or it is getting harder to urinate.     · You have symptoms of a urinary tract infection. These may include:  ? Pain or burning when you urinate. ? A frequent need to urinate without being able to pass much urine. ? Pain in the flank, which is just below the rib cage and above the waist on either side of the back. ? Blood in your urine. ? A fever.    Watch closely for changes in your health, and be sure to contact your doctor if:    · You have any problems with your catheter.     · You do not get better as expected. Where can you learn more? Go to http://mario-basil.info/. Enter M244 in the search box to learn more about \"Urinary Retention: Care Instructions. \"  Current as of: March 20, 2018  Content Version: 11.9  © 2983-2559 [employer]. Care instructions adapted under license by [employer] (which disclaims liability or warranty for this information). If you have questions about a medical condition or this instruction, always ask your healthcare professional. [address] any warranty or liability for your use of this information.

## 2019-03-15 NOTE — ED NOTES
Patient received leg bag and patel bag teaching, follow up information, and discharge teaching well. Denies pain. Vitals stable. No acute distress noted.

## 2019-03-19 ENCOUNTER — APPOINTMENT (OUTPATIENT)
Dept: ULTRASOUND IMAGING | Age: 64
End: 2019-03-19
Attending: NURSE PRACTITIONER
Payer: MEDICARE

## 2019-03-19 ENCOUNTER — HOSPITAL ENCOUNTER (EMERGENCY)
Age: 64
Discharge: HOME | End: 2019-03-19
Attending: EMERGENCY MEDICINE | Admitting: EMERGENCY MEDICINE
Payer: MEDICARE

## 2019-03-19 ENCOUNTER — APPOINTMENT (OUTPATIENT)
Dept: CT IMAGING | Age: 64
End: 2019-03-19
Attending: NURSE PRACTITIONER
Payer: MEDICARE

## 2019-03-19 VITALS
SYSTOLIC BLOOD PRESSURE: 151 MMHG | TEMPERATURE: 98.1 F | RESPIRATION RATE: 18 BRPM | HEART RATE: 63 BPM | WEIGHT: 160 LBS | HEIGHT: 70 IN | OXYGEN SATURATION: 99 % | BODY MASS INDEX: 22.9 KG/M2 | DIASTOLIC BLOOD PRESSURE: 91 MMHG

## 2019-03-19 DIAGNOSIS — I86.1 VARICOCELE: Primary | ICD-10-CM

## 2019-03-19 DIAGNOSIS — K64.9 HEMORRHOIDS, UNSPECIFIED HEMORRHOID TYPE: ICD-10-CM

## 2019-03-19 DIAGNOSIS — G89.18 PAIN FOLLOWING SURGERY OR PROCEDURE: ICD-10-CM

## 2019-03-19 LAB
ALBUMIN SERPL-MCNC: 3.8 G/DL (ref 3.4–5)
ALBUMIN/GLOB SERPL: 0.9 {RATIO} (ref 0.8–1.7)
ALP SERPL-CCNC: 62 U/L (ref 45–117)
ALT SERPL-CCNC: 22 U/L (ref 16–61)
ANION GAP SERPL CALC-SCNC: 4 MMOL/L (ref 3–18)
APPEARANCE UR: CLEAR
AST SERPL-CCNC: 24 U/L (ref 15–37)
BACTERIA URNS QL MICRO: ABNORMAL /HPF
BASOPHILS # BLD: 0 K/UL (ref 0–0.1)
BASOPHILS NFR BLD: 0 % (ref 0–2)
BILIRUB DIRECT SERPL-MCNC: <0.1 MG/DL (ref 0–0.2)
BILIRUB SERPL-MCNC: 0.4 MG/DL (ref 0.2–1)
BILIRUB UR QL: NEGATIVE
BUN SERPL-MCNC: 23 MG/DL (ref 7–18)
BUN/CREAT SERPL: 26 (ref 12–20)
CALCIUM SERPL-MCNC: 8.8 MG/DL (ref 8.5–10.1)
CHLORIDE SERPL-SCNC: 106 MMOL/L (ref 100–108)
CO2 SERPL-SCNC: 28 MMOL/L (ref 21–32)
COLOR UR: ABNORMAL
CREAT SERPL-MCNC: 0.88 MG/DL (ref 0.6–1.3)
DIFFERENTIAL METHOD BLD: ABNORMAL
EOSINOPHIL # BLD: 0.2 K/UL (ref 0–0.4)
EOSINOPHIL NFR BLD: 2 % (ref 0–5)
EPITH CASTS URNS QL MICRO: ABNORMAL /LPF (ref 0–5)
ERYTHROCYTE [DISTWIDTH] IN BLOOD BY AUTOMATED COUNT: 12.4 % (ref 11.6–14.5)
GLOBULIN SER CALC-MCNC: 4.1 G/DL (ref 2–4)
GLUCOSE SERPL-MCNC: 89 MG/DL (ref 74–99)
GLUCOSE UR STRIP.AUTO-MCNC: NEGATIVE MG/DL
HCT VFR BLD AUTO: 41.1 % (ref 36–48)
HGB BLD-MCNC: 13.7 G/DL (ref 13–16)
HGB UR QL STRIP: ABNORMAL
KETONES UR QL STRIP.AUTO: 15 MG/DL
LEUKOCYTE ESTERASE UR QL STRIP.AUTO: ABNORMAL
LYMPHOCYTES # BLD: 1.4 K/UL (ref 0.9–3.6)
LYMPHOCYTES NFR BLD: 17 % (ref 21–52)
MCH RBC QN AUTO: 29.8 PG (ref 24–34)
MCHC RBC AUTO-ENTMCNC: 33.3 G/DL (ref 31–37)
MCV RBC AUTO: 89.5 FL (ref 74–97)
MONOCYTES # BLD: 0.6 K/UL (ref 0.05–1.2)
MONOCYTES NFR BLD: 7 % (ref 3–10)
MUCOUS THREADS URNS QL MICRO: ABNORMAL /LPF
NEUTS SEG # BLD: 5.9 K/UL (ref 1.8–8)
NEUTS SEG NFR BLD: 74 % (ref 40–73)
NITRITE UR QL STRIP.AUTO: NEGATIVE
PH UR STRIP: 6.5 [PH] (ref 5–8)
PLATELET # BLD AUTO: 277 K/UL (ref 135–420)
PMV BLD AUTO: 9 FL (ref 9.2–11.8)
POTASSIUM SERPL-SCNC: 4.6 MMOL/L (ref 3.5–5.5)
PROT SERPL-MCNC: 7.9 G/DL (ref 6.4–8.2)
PROT UR STRIP-MCNC: 30 MG/DL
RBC # BLD AUTO: 4.59 M/UL (ref 4.7–5.5)
RBC #/AREA URNS HPF: ABNORMAL /HPF (ref 0–5)
SODIUM SERPL-SCNC: 138 MMOL/L (ref 136–145)
SP GR UR REFRACTOMETRY: 1.03 (ref 1–1.03)
UROBILINOGEN UR QL STRIP.AUTO: 0.2 EU/DL (ref 0.2–1)
WBC # BLD AUTO: 8 K/UL (ref 4.6–13.2)
WBC URNS QL MICRO: ABNORMAL /HPF (ref 0–4)

## 2019-03-19 PROCEDURE — 80076 HEPATIC FUNCTION PANEL: CPT

## 2019-03-19 PROCEDURE — 74011636320 HC RX REV CODE- 636/320: Performed by: EMERGENCY MEDICINE

## 2019-03-19 PROCEDURE — 81001 URINALYSIS AUTO W/SCOPE: CPT

## 2019-03-19 PROCEDURE — 74011250637 HC RX REV CODE- 250/637: Performed by: NURSE PRACTITIONER

## 2019-03-19 PROCEDURE — 87086 URINE CULTURE/COLONY COUNT: CPT

## 2019-03-19 PROCEDURE — 76870 US EXAM SCROTUM: CPT

## 2019-03-19 PROCEDURE — 85025 COMPLETE CBC W/AUTO DIFF WBC: CPT

## 2019-03-19 PROCEDURE — 80048 BASIC METABOLIC PNL TOTAL CA: CPT

## 2019-03-19 PROCEDURE — 74177 CT ABD & PELVIS W/CONTRAST: CPT

## 2019-03-19 PROCEDURE — 99284 EMERGENCY DEPT VISIT MOD MDM: CPT

## 2019-03-19 RX ORDER — IBUPROFEN 400 MG/1
800 TABLET ORAL
Status: COMPLETED | OUTPATIENT
Start: 2019-03-19 | End: 2019-03-19

## 2019-03-19 RX ORDER — HYDROCORTISONE 25 MG/G
CREAM TOPICAL
Status: COMPLETED | OUTPATIENT
Start: 2019-03-19 | End: 2019-03-19

## 2019-03-19 RX ORDER — HYDROCORTISONE 25 MG/G
CREAM TOPICAL 4 TIMES DAILY
Qty: 30 G | Refills: 0 | Status: SHIPPED | OUTPATIENT
Start: 2019-03-19 | End: 2019-07-01

## 2019-03-19 RX ORDER — CEPHALEXIN 500 MG/1
CAPSULE ORAL
Qty: 40 CAP | Refills: 0 | Status: SHIPPED | OUTPATIENT
Start: 2019-03-19 | End: 2019-07-01

## 2019-03-19 RX ORDER — IBUPROFEN 800 MG/1
800 TABLET ORAL
Qty: 20 TAB | Refills: 0 | Status: SHIPPED | OUTPATIENT
Start: 2019-03-19 | End: 2019-03-26

## 2019-03-19 RX ORDER — OXYCODONE AND ACETAMINOPHEN 5; 325 MG/1; MG/1
1 TABLET ORAL
Status: COMPLETED | OUTPATIENT
Start: 2019-03-19 | End: 2019-03-19

## 2019-03-19 RX ADMIN — HYDROCORTISONE 2.5%: 25 CREAM TOPICAL at 11:14

## 2019-03-19 RX ADMIN — IOPAMIDOL 94 ML: 612 INJECTION, SOLUTION INTRAVENOUS at 12:13

## 2019-03-19 RX ADMIN — IBUPROFEN 800 MG: 400 TABLET ORAL at 11:14

## 2019-03-19 RX ADMIN — OXYCODONE AND ACETAMINOPHEN 1 TABLET: 5; 325 TABLET ORAL at 11:14

## 2019-03-19 NOTE — ED TRIAGE NOTES
Patient had hernia surgery and was unable to void, had patel placed on Friday, patient now with swelling and bruising to scrotum and severe hemorrhoids

## 2019-03-19 NOTE — ED PROVIDER NOTES
Kurtis Domingo is a 75year old male who presents to the ED with a c/o abdominal pain, scrotal pain and swelling, and hemorrhoid pain for the past three days. Pt had inguinal surgery repair here at [facility] with Dr Rosaura Eckert on 03-14-19. The following day, he found that he could not void. He came into the ED and a patel catheter was placed. He has an appointment with a provider at [facility] in the near future, and has not follow up with Dr Rosaura Eckert for post op care. After the patel was placed, Pt developed inflamed external hemorrhoids and pain and discoloration in the testicles. He called the Urology office, and was told to go to the ER for follow up. Pt also states he has had multiple episodes of diarrhea. Past Medical History:  
Diagnosis Date  Arthritis  Varicose vein of leg LEFT LEG Past Surgical History:  
Procedure Laterality Date  HX COLONOSCOPY    
 HX OTHER SURGICAL  1986  
 sebaceous cyst removed from back History reviewed. No pertinent family history. Social History Socioeconomic History  Marital status: SINGLE Spouse name: Not on file  Number of children: Not on file  Years of education: Not on file  Highest education level: Not on file Social Needs  Financial resource strain: Not on file  Food insecurity - worry: Not on file  Food insecurity - inability: Not on file  Transportation needs - medical: Not on file  Transportation needs - non-medical: Not on file Occupational History  Not on file Tobacco Use  Smoking status: Never Smoker  Smokeless tobacco: Never Used Substance and Sexual Activity  Alcohol use: Yes Frequency: Never Comment: BEER OR WINE OCCAS  
 Drug use: No  
 Sexual activity: Not Currently Other Topics Concern  Not on file Social History Narrative  Not on file ALLERGIES: Patient has no known allergies. Review of Systems Constitutional: Negative. HENT: Negative. Eyes: Negative. Respiratory: Negative. Cardiovascular: Negative. Gastrointestinal: Positive for abdominal pain and rectal pain (Hemorrhoid). Diarrhea Endocrine: Negative. Genitourinary: Positive for scrotal swelling and testicular pain. Inability to void Musculoskeletal: Negative. Skin: Negative. Allergic/Immunologic: Negative. Neurological: Negative. Psychiatric/Behavioral: Negative. Vitals:  
 03/19/19 0930 BP: (!) 138/100 Pulse: 95 Resp: 18 Temp: 98.1 °F (36.7 °C) SpO2: 99% Weight: 72.6 kg (160 lb) Height: 5' 9.75\" (1.772 m) Physical Exam  
Constitutional: He appears well-developed and well-nourished. No distress. HENT:  
Head: Normocephalic and atraumatic. Nose: Nose normal.  
Eyes: EOM are normal. Right eye exhibits no discharge. Left eye exhibits no discharge. No scleral icterus. Neck: Normal range of motion. Neck supple. No tracheal deviation present. Cardiovascular: Normal rate, regular rhythm and intact distal pulses. Pulmonary/Chest: Effort normal and breath sounds normal.  
Abdominal: Soft. He exhibits no distension. Genitourinary:  
Genitourinary Comments: The testicles are modestly TTP bilaterally. There is no testicular edema or masses noted. The right scrotum has ecchymosis noted. There is a patel catheter placed running to a leg bag. Multiple external non thrombosed hemorrhoids. Ranjit Raphael NP  2:36 PM 
 
   
Musculoskeletal: He exhibits no deformity. Neurological: He is alert. Coordination normal.  
Skin: Skin is warm and dry. NE. Psychiatric: He has a normal mood and affect. His behavior is normal.  
Nursing note and vitals reviewed. MDM Number of Diagnoses or Management Options Diagnosis management comments: CONSULT:    Discuss the Pt with Dr Marylou Mejia from Urology. He advised to start the Pt on Kelfex for Varicocele. Advised against pulling the catheter. Requested that we run a urine culture. Urology will try to move the appointment up. Angeles Damon NP.   2:35 PM 
 
 
  
Amount and/or Complexity of Data Reviewed Clinical lab tests: ordered and reviewed Tests in the radiology section of CPT®: ordered and reviewed Risk of Complications, Morbidity, and/or Mortality Presenting problems: moderate Diagnostic procedures: moderate Management options: moderate Patient Progress Patient progress: stable Procedures Diagnosis: 1. Varicocele 2. Pain following surgery or procedure 3. Hemorrhoids, unspecified hemorrhoid type Disposition:   Discharged to Home Follow-up Information Follow up With Specialties Details Why Contact Info Baljeet Alarcon MD Urology Call today to move appointment up   [address] [place] [address] 
[phone] Medication List  
  
START taking these medications   
cephALEXin 500 mg capsule Commonly known as:  Junius Alpers Take two caps PO in the morning, and two caps PO in the evening for ten days. hydrocortisone 2.5 % rectal cream 
Commonly known as:  ANUSOL-HC Insert  into rectum four (4) times daily. ibuprofen 800 mg tablet Commonly known as:  MOTRIN Take 1 Tab by mouth every eight (8) hours as needed for Pain for up to 7 days. Where to Get Your Medications Information about where to get these medications is not yet available Ask your nurse or doctor about these medications · cephALEXin 500 mg capsule · hydrocortisone 2.5 % rectal cream 
· ibuprofen 800 mg tablet

## 2019-03-19 NOTE — DISCHARGE INSTRUCTIONS
[employer] Activation    Thank you for requesting access to [employer]. Please follow the instructions below to securely access and download your online medical record. [employer] allows you to send messages to your doctor, view your test results, renew your prescriptions, schedule appointments, and more. How Do I Sign Up? 1. In your internet browser, go to [email or web address]  2. Click on the First Time User? Click Here link in the Sign In box. You will be redirect to the New Member Sign Up page. 3. Enter your [employer] Access Code exactly as it appears below. You will not need to use this code after youve completed the sign-up process. If you do not sign up before the expiration date, you must request a new code. [employer] Access Code: Activation code not generated  Current [employer] Status: Active (This is the date your [employer] access code will )    4. Enter the last four digits of your Social Security Number (xxxx) and Date of Birth (mm/dd/yyyy) as indicated and click Submit. You will be taken to the next sign-up page. 5. Create a [employer] ID. This will be your [employer] login ID and cannot be changed, so think of one that is secure and easy to remember. 6. Create a [employer] password. You can change your password at any time. 7. Enter your Password Reset Question and Answer. This can be used at a later time if you forget your password. 8. Enter your e-mail address. You will receive e-mail notification when new information is available in [address] 9. Click Sign Up. You can now view and download portions of your medical record. 10. Click the Download Summary menu link to download a portable copy of your medical information. Additional Information    If you have questions, please visit the Frequently Asked Questions section of the [employer] website at [email or web address] Remember, [employer] is NOT to be used for urgent needs. For medical emergencies, dial 911.     Call dr Hollis Costa office and discuss movement of appointment. Take antibiotic as prescribed.

## 2019-03-21 LAB
BACTERIA SPEC CULT: NORMAL
SERVICE CMNT-IMP: NORMAL

## 2019-03-25 ENCOUNTER — HOSPITAL ENCOUNTER (EMERGENCY)
Age: 64
Discharge: HOME | End: 2019-03-25
Attending: EMERGENCY MEDICINE
Payer: MEDICARE

## 2019-03-25 VITALS
BODY MASS INDEX: 23.7 KG/M2 | DIASTOLIC BLOOD PRESSURE: 95 MMHG | WEIGHT: 160 LBS | RESPIRATION RATE: 17 BRPM | HEIGHT: 69 IN | HEART RATE: 89 BPM | SYSTOLIC BLOOD PRESSURE: 170 MMHG | OXYGEN SATURATION: 100 % | TEMPERATURE: 97.8 F

## 2019-03-25 DIAGNOSIS — N39.0 ACUTE UTI: ICD-10-CM

## 2019-03-25 DIAGNOSIS — R33.8 ACUTE RETENTION OF URINE: Primary | ICD-10-CM

## 2019-03-25 DIAGNOSIS — R03.0 ELEVATED BLOOD PRESSURE READING: ICD-10-CM

## 2019-03-25 LAB
APPEARANCE UR: CLEAR
BACTERIA URNS QL MICRO: ABNORMAL /HPF
BILIRUB UR QL: NEGATIVE
COLOR UR: YELLOW
EPITH CASTS URNS QL MICRO: ABNORMAL /LPF (ref 0–5)
GLUCOSE UR STRIP.AUTO-MCNC: NEGATIVE MG/DL
HGB UR QL STRIP: ABNORMAL
KETONES UR QL STRIP.AUTO: NEGATIVE MG/DL
LEUKOCYTE ESTERASE UR QL STRIP.AUTO: ABNORMAL
NITRITE UR QL STRIP.AUTO: NEGATIVE
PH UR STRIP: 6 [PH] (ref 5–8)
PROT UR STRIP-MCNC: NEGATIVE MG/DL
RBC #/AREA URNS HPF: ABNORMAL /HPF (ref 0–5)
SP GR UR REFRACTOMETRY: 1.01 (ref 1–1.03)
UROBILINOGEN UR QL STRIP.AUTO: 0.2 EU/DL (ref 0.2–1)
WBC URNS QL MICRO: ABNORMAL /HPF (ref 0–4)

## 2019-03-25 PROCEDURE — 81001 URINALYSIS AUTO W/SCOPE: CPT

## 2019-03-25 PROCEDURE — 51798 US URINE CAPACITY MEASURE: CPT

## 2019-03-25 PROCEDURE — 51702 INSERT TEMP BLADDER CATH: CPT

## 2019-03-25 PROCEDURE — 99283 EMERGENCY DEPT VISIT LOW MDM: CPT

## 2019-03-25 PROCEDURE — 77030034850

## 2019-03-25 RX ORDER — PHENAZOPYRIDINE HYDROCHLORIDE 200 MG/1
200 TABLET, FILM COATED ORAL 3 TIMES DAILY
Qty: 6 TAB | Refills: 0 | Status: SHIPPED | OUTPATIENT
Start: 2019-03-25 | End: 2019-03-27

## 2019-03-25 NOTE — DISCHARGE INSTRUCTIONS
Patient Education        Elevated Blood Pressure: Care Instructions  Your Care Instructions    Blood pressure is a measure of how hard the blood pushes against the walls of your arteries. It's normal for blood pressure to go up and down throughout the day. But if it stays up over time, you have high blood pressure. Two numbers tell you your blood pressure. The first number is the systolic pressure. It shows how hard the blood pushes when your heart is pumping. The second number is the diastolic pressure. It shows how hard the blood pushes between heartbeats, when your heart is relaxed and filling with blood. An ideal blood pressure in adults is less than 120/80 (say \"120 over 80\"). High blood pressure is 140/90 or higher. You have high blood pressure if your top number is 140 or higher or your bottom number is 90 or higher, or both. The main test for high blood pressure is simple, fast, and painless. To diagnose high blood pressure, your doctor will test your blood pressure at different times. After testing your blood pressure, your doctor may ask you to test it again when you are home. If you are diagnosed with high blood pressure, you can work with your doctor to make a long-term plan to manage it. Follow-up care is a key part of your treatment and safety. Be sure to make and go to all appointments, and call your doctor if you are having problems. It's also a good idea to know your test results and keep a list of the medicines you take. How can you care for yourself at home? · Do not smoke. Smoking increases your risk for heart attack and stroke. If you need help quitting, talk to your doctor about stop-smoking programs and medicines. These can increase your chances of quitting for good. · Stay at a healthy weight. · Try to limit how much sodium you eat to less than 2,300 milligrams (mg) a day. Your doctor may ask you to try to eat less than 1,500 mg a day. · Be physically active.  Get at least 30 minutes of exercise on most days of the week. Walking is a good choice. You also may want to do other activities, such as running, swimming, cycling, or playing tennis or team sports. · Avoid or limit alcohol. Talk to your doctor about whether you can drink any alcohol. · Eat plenty of fruits, vegetables, and low-fat dairy products. Eat less saturated and total fats. · Learn how to check your blood pressure at home. When should you call for help? Call your doctor now or seek immediate medical care if:  ? · Your blood pressure is much higher than normal (such as 180/110 or higher). ? · You think high blood pressure is causing symptoms such as:  ¨ Severe headache. ¨ Blurry vision. ? Watch closely for changes in your health, and be sure to contact your doctor if:  ? · You do not get better as expected. Where can you learn more? Go to http://mario-basil.info/. Enter A747 in the search box to learn more about \"Elevated Blood Pressure: Care Instructions. \"  Current as of: September 21, 2016  Content Version: 11.4  © 9823-2259 [employer]. Care instructions adapted under license by [employer] (which disclaims liability or warranty for this information). If you have questions about a medical condition or this instruction, always ask your healthcare professional. [place] [address] any warranty or liability for your use of this information. Patient Education        Urinary Tract Infections in Men: Care Instructions  Your Care Instructions    A urinary tract infection, or UTI, is a general term for an infection anywhere between the kidneys and the tip of the penis. UTIs can also be a result of a prostate problem. Most cause pain or burning when you urinate. Most UTIs are caused by bacteria and can be cured with antibiotics. It is important to complete your treatment so that the infection does not get worse.   Follow-up care is a key part of your treatment and safety. Be sure to make and go to all appointments, and call your doctor if you are having problems. It's also a good idea to know your test results and keep a list of the medicines you take. How can you care for yourself at home? · Take your antibiotics as prescribed. Do not stop taking them just because you feel better. You need to take the full course of antibiotics. · Take your medicines exactly as prescribed. Your doctor may have prescribed a medicine, such as phenazopyridine (Pyridium), to help relieve pain when you urinate. This turns your urine orange. You may stop taking it when your symptoms get better. But be sure to take all of your antibiotics, which treat the infection. · Drink extra water for the next day or two. This will help make the urine less concentrated and help wash out the bacteria causing the infection. (If you have kidney, heart, or liver disease and have to limit your fluids, talk with your doctor before you increase your fluid intake.)  · Avoid drinks that are carbonated or have caffeine. They can irritate the bladder. · Urinate often. Try to empty your bladder each time. · To relieve pain, take a hot bath or lay a heating pad (set on low) over your lower belly or genital area. Never go to sleep with a heating pad in place. To help prevent UTIs  · Drink plenty of fluids, enough so that your urine is light yellow or clear like water. If you have kidney, heart, or liver disease and have to limit fluids, talk with your doctor before you increase the amount of fluids you drink. · Urinate when you have the urge. Do not hold your urine for a long time. Urinate before you go to sleep. · Keep your penis clean. Catheter care  If you have a drainage tube (catheter) in place, the following steps will help you care for it. · Always wash your hands before and after touching your catheter. · Check the area around the urethra for inflammation or signs of infection.  Signs of infection include irritated, swollen, red, or tender skin, or pus around the catheter. · Clean the area around the catheter with soap and water two times a day. Dry with a clean towel afterward. · Do not apply powder or lotion to the skin around the catheter. To empty the urine collection bag  · Wash your hands with soap and water. · Without touching the drain spout, remove the spout from its sleeve at the bottom of the collection bag. Open the valve on the spout. · Let the urine flow out of the bag and into the toilet or a container. Do not let the tubing or drain spout touch anything. · After you empty the bag, clean the end of the drain spout with tissue and water. Close the valve and put the drain spout back into its sleeve at the bottom of the collection bag. · Wash your hands with soap and water. When should you call for help? Call your doctor now or seek immediate medical care if:    · Symptoms such as a fever, chills, nausea, or vomiting get worse or happen for the first time.     · You have new pain in your back just below your rib cage. This is called flank pain.     · There is new blood or pus in your urine.     · You are not able to take or keep down your antibiotics.    Watch closely for changes in your health, and be sure to contact your doctor if:    · You are not getting better after taking an antibiotic for 2 days.     · Your symptoms go away but then come back. Where can you learn more? Go to http://mario-basil.info/. Enter E367 in the search box to learn more about \"Urinary Tract Infections in Men: Care Instructions. \"  Current as of: March 20, 2018  Content Version: 11.9  © 1088-4271 [employer]. Care instructions adapted under license by [employer] (which disclaims liability or warranty for this information).  If you have questions about a medical condition or this instruction, always ask your healthcare professional. Alberto Last disclaims any warranty or liability for your use of this information. Patient Education        Urinary Retention: Care Instructions  Your Care Instructions    Urinary retention means that you aren't able to urinate. In men, it is often caused by a blockage of the urinary tract from an enlarged prostate gland. In men and women, it can also be caused by an infection or nerve damage. Or it may be a side effect of a medicine. The doctor may have drained the urine from your bladder. If you still have problems passing urine, you may need to use a catheter at home. This is used to empty your bladder until the problem can be fixed. Your doctor may put a catheter in your bladder before you go home. If so, he or she will tell you when to come back to have the catheter removed. The doctor has checked you closely. But problems can develop later. If you notice any problems or new symptoms, get medical treatment right away. Follow-up care is a key part of your treatment and safety. Be sure to make and go to all appointments, and call your doctor if you are having problems. It's also a good idea to know your test results and keep a list of the medicines you take. How can you care for yourself at home? · Take your medicines exactly as prescribed. Call your doctor if you think you are having a problem with your medicine. You will get more details on the specific medicines your doctor prescribes. · Check with your doctor before you use any over-the-counter medicines. Many cold and allergy medicines, for example, can make this problem worse. Make sure your doctor knows all of the medicines, vitamins, supplements, and herbal remedies you take. · Spread out through the day the amount of fluid you drink. Do not drink a lot at bedtime. · Avoid alcohol and caffeine. · If you have been given a catheter, or if one is already in place, follow the instructions you were given.  Always wash your hands before and after you handle the catheter. When should you call for help? Call your doctor now or seek immediate medical care if:    · You cannot urinate at all, or it is getting harder to urinate.     · You have symptoms of a urinary tract infection. These may include:  ? Pain or burning when you urinate. ? A frequent need to urinate without being able to pass much urine. ? Pain in the flank, which is just below the rib cage and above the waist on either side of the back. ? Blood in your urine. ? A fever.    Watch closely for changes in your health, and be sure to contact your doctor if:    · You have any problems with your catheter.     · You do not get better as expected. Where can you learn more? Go to http://mario-basil.info/. Enter M244 in the search box to learn more about \"Urinary Retention: Care Instructions. \"  Current as of: March 20, 2018  Content Version: 11.9  © 5959-8899 [employer]. Care instructions adapted under license by [employer] (which disclaims liability or warranty for this information). If you have questions about a medical condition or this instruction, always ask your healthcare professional. [address] any warranty or liability for your use of this information. Patient Education        Male Urinary Tract: Anatomy Sketch    Current as of: September 26, 2018  Content Version: 11.9  © 8153-2638 [employer]. Care instructions adapted under license by [employer] (which disclaims liability or warranty for this information). If you have questions about a medical condition or this instruction, always ask your healthcare professional. [address] any warranty or liability for your use of this information. [employer] Activation    Thank you for requesting access to [employer]. Please follow the instructions below to securely access and download your online medical record.  [employer] allows you to send messages to your doctor, view your test results, renew your prescriptions, schedule appointments, and more. How Do I Sign Up? 1. In your internet browser, go to [email or web address]  2. Click on the First Time User? Click Here link in the Sign In box. You will be redirect to the New Member Sign Up page. 3. Enter your Greysox Access Code exactly as it appears below. You will not need to use this code after youve completed the sign-up process. If you do not sign up before the expiration date, you must request a new code. MyChart Access Code: Activation code not generated  Current Greysox Status: Active (This is the date your Greysox access code will )    4. Enter the last four digits of your Social Security Number (xxxx) and Date of Birth (mm/dd/yyyy) as indicated and click Submit. You will be taken to the next sign-up page. 5. Create a Greysox ID. This will be your Greysox login ID and cannot be changed, so think of one that is secure and easy to remember. 6. Create a Greysox password. You can change your password at any time. 7. Enter your Password Reset Question and Answer. This can be used at a later time if you forget your password. 8. Enter your e-mail address. You will receive e-mail notification when new information is available in [address] 9. Click Sign Up. You can now view and download portions of your medical record. 10. Click the Download Summary menu link to download a portable copy of your medical information. Additional Information    If you have questions, please visit the Frequently Asked Questions section of the Greysox website at [email or web address] Remember, Greysox is NOT to be used for urgent needs. For medical emergencies, dial 911. Follow up with [facility] for further evaluation and treatment.

## 2019-03-25 NOTE — PROGRESS NOTES
Chart reviewed for multiple ED visits. Met with pt. Pt states no PCP but seen a urologist and surgeon. Pt encouraged to establish care with PCP and states he will.

## 2019-03-25 NOTE — ED PROVIDER NOTES
Tarun Valdes is a 75year old male who presents to the ED with a c/o inability to urinate. States he has had a patel catheter inserted into his bladder, and had it removed at [facility] today. He has not been able to urinate since removal and is now complaining of pain. Pt wishes to have another patel catheter placed. Past Medical History:  
Diagnosis Date  Arthritis  Varicose vein of leg LEFT LEG Past Surgical History:  
Procedure Laterality Date  HX COLONOSCOPY    
 HX OTHER SURGICAL  1986  
 sebaceous cyst removed from back History reviewed. No pertinent family history. Social History Socioeconomic History  Marital status: SINGLE Spouse name: Not on file  Number of children: Not on file  Years of education: Not on file  Highest education level: Not on file Occupational History  Not on file Social Needs  Financial resource strain: Not on file  Food insecurity:  
  Worry: Not on file Inability: Not on file  Transportation needs:  
  Medical: Not on file Non-medical: Not on file Tobacco Use  Smoking status: Never Smoker  Smokeless tobacco: Never Used Substance and Sexual Activity  Alcohol use: Yes Frequency: Never Comment: BEER OR WINE OCCAS  
 Drug use: No  
 Sexual activity: Not Currently Lifestyle  Physical activity:  
  Days per week: Not on file Minutes per session: Not on file  Stress: Not on file Relationships  Social connections:  
  Talks on phone: Not on file Gets together: Not on file Attends [employer] service: Not on file Active member of club or organization: Not on file Attends meetings of clubs or organizations: Not on file Relationship status: Not on file  Intimate partner violence:  
  Fear of current or ex partner: Not on file Emotionally abused: Not on file Physically abused: Not on file Forced sexual activity: Not on file Other Topics Concern  Not on file Social History Narrative  Not on file ALLERGIES: Patient has no known allergies. Review of Systems Constitutional: Negative. HENT: Negative. Eyes: Negative. Respiratory: Negative. Cardiovascular: Negative. Gastrointestinal: Negative. Endocrine: Negative. Genitourinary: Negative. Musculoskeletal: Negative. Skin: Negative. Neurological: Negative. Hematological: Negative. Psychiatric/Behavioral: Negative. Vitals:  
 03/25/19 1350 BP: (!) 162/115 Pulse: (!) 102 Resp: 17 Temp: 97.8 °F (36.6 °C) SpO2: 100% Weight: 72.6 kg (160 lb) Height: 5' 9\" (1.753 m) Physical Exam  
Constitutional: He appears well-developed and well-nourished. No distress. HENT:  
Head: Normocephalic and atraumatic. Nose: Nose normal.  
Eyes: EOM are normal. Right eye exhibits no discharge. Left eye exhibits no discharge. No scleral icterus. Neck: Normal range of motion. Neck supple. No tracheal deviation present. Cardiovascular: Normal rate, regular rhythm and intact distal pulses. Pulmonary/Chest: Effort normal and breath sounds normal.  
Abdominal: Soft. He exhibits no distension. Genitourinary:  
Genitourinary Comments: Pt TTP over the bladder. Scan reveals 20 ml retained. Millan catheter was placed and 500 ml was collected immediately. Musculoskeletal: Normal range of motion. He exhibits no deformity. Neurological: He is alert. Coordination normal.  
Skin: Skin is warm and dry. NE. Psychiatric: He has a normal mood and affect. His behavior is normal.  
Nursing note and vitals reviewed. MDM Number of Diagnoses or Management Options Acute retention of urine:  
Acute UTI:  
Elevated blood pressure reading:  
Diagnosis management comments: PROGRESS NOTE:  One or more blood pressure readings were noted elevated during the Pt's presentation in the emergency department this date. This abnormal reading has been cited in the Pt's diagnosis, and they have been encouraged to follow up with their primary care physician, or referred to a consultant for further evaluation and treatment. Cherri Bacon NP   2:49 PM 
 
MDM:  Pt has low grade UTI. Pt is still taking Keflex as previously prescribed. Will also send home with catheter, for follow up with [facility]. Cherri Bacon NP  2:50 PM 
 
CONSULT:    Discussed the Pt with Dr Cassius Veras, who advised leaving the patel in, and have the Pt follow up with [facility]. Cherri Bacon NP   4:03 PM 
 
   
 
 
  
Amount and/or Complexity of Data Reviewed Clinical lab tests: ordered and reviewed Risk of Complications, Morbidity, and/or Mortality Presenting problems: low Diagnostic procedures: low Management options: low Patient Progress Patient progress: stable Procedures Diagnosis: 1. Acute retention of urine 2. Acute UTI 3. Elevated blood pressure reading Disposition:   Discharged to Home Follow-up Information Follow up With Specialties Details Why Contact Info Shira Teran MD Urology Call today to arrange follow up   [address] [place] [address] 
[phone] Patient's Medications Start Taking PHENAZOPYRIDINE (PYRIDIUM) 200 MG TABLET    Take 1 Tab by mouth three (3) times daily for 2 days. Continue Taking CEPHALEXIN (KEFLEX) 500 MG CAPSULE    Take two caps PO in the morning, and two caps PO in the evening for ten days. HYDROCORTISONE (ANUSOL-HC) 2.5 % RECTAL CREAM    Insert  into rectum four (4) times daily. IBUPROFEN (MOTRIN) 800 MG TABLET    Take 1 Tab by mouth every eight (8) hours as needed for Pain for up to 7 days. TAMSULOSIN (FLOMAX) 0.4 MG CAPSULE    Take 1 Cap by mouth daily (after dinner). These Medications have changed No medications on file Stop Taking No medications on file

## 2019-03-25 NOTE — ED TRIAGE NOTES
Had patel removed  1030 today at [facility]. . Now has pain, no urination since then. Needs patel reinserted.

## 2019-03-25 NOTE — ED NOTES
I performed a brief evaluation, including history and physical, of the patient here in triage and I have determined that pt will need further treatment and evaluation from the main side ER physician. I have placed initial orders to help in expediting patients care. March 25, 2019 at 1:51 PM - KENDALL Sparrow Visit Vitals BP (!) 162/115 (BP 1 Location: Right arm, BP Patient Position: At rest) Pulse (!) 102 Temp 97.8 °F (36.6 °C) Resp 17 Ht 5' 9\" (1.753 m) Wt 72.6 kg (160 lb) SpO2 100% BMI 23.63 kg/m²

## 2019-03-29 ENCOUNTER — OFFICE VISIT (OUTPATIENT)
Dept: SURGERY | Age: 64
End: 2019-03-29

## 2019-03-29 VITALS
SYSTOLIC BLOOD PRESSURE: 139 MMHG | HEIGHT: 69 IN | HEART RATE: 77 BPM | TEMPERATURE: 98.4 F | BODY MASS INDEX: 24.44 KG/M2 | WEIGHT: 165 LBS | OXYGEN SATURATION: 97 % | DIASTOLIC BLOOD PRESSURE: 78 MMHG

## 2019-03-29 DIAGNOSIS — Z09 POSTOPERATIVE EXAMINATION: Primary | ICD-10-CM

## 2019-03-29 NOTE — PATIENT INSTRUCTIONS
If you have any questions or concerns about today's appointment, the verbal and/or written instructions you were given for follow up care, please call our office at [phone]. [facility] - [address], [address] 
 
[phone] office [phone]

## 2019-03-29 NOTE — PROGRESS NOTES
Je Beck is a 75 y.o. male (: 1955) presenting to address: Chief Complaint Patient presents with  Surgical Follow-up  
  post op, repair of right side inguinal hernia Medication list and allergies have been reviewed with Je Beck and updated as of today's date. I have gone over all Medical, Surgical and Social History with Je Beck and updated/added the information accordingly. Pt reports that he has some tenderness in the right testicle. He expresses frustration with urinary retention and elevated PSA and enlarged prostate. Pt denies pain and fever, N&V but does report diarrhea from abx.  
 
 
1. Have you been to the ER, urgent care clinic since your last visit? Hospitalized since your last visit? Yes. Urinary retention 2. Have you seen or consulted any other health care providers outside of the [facility] [place] since your last visit? Include any pap smears or colon screening. Yes Dr. Sandrita Brizuela with Urology

## 2019-03-29 NOTE — PROGRESS NOTES
Patient seen and examined. He is doing well . He had to have the Millan placed twice for his prostate enlargement . From the hernia standpoint he is doing well and tolerating regular diet His abdomen is soft and non-tender and his wounds are healing well. His right groin exam is normal . Follow-up with urology  
follow-up with me as needed

## 2019-05-01 ENCOUNTER — OFFICE VISIT (OUTPATIENT)
Dept: FAMILY MEDICINE CLINIC | Age: 64
End: 2019-05-01

## 2019-05-01 VITALS
WEIGHT: 158.8 LBS | BODY MASS INDEX: 23.52 KG/M2 | TEMPERATURE: 98.8 F | HEART RATE: 68 BPM | SYSTOLIC BLOOD PRESSURE: 115 MMHG | RESPIRATION RATE: 16 BRPM | DIASTOLIC BLOOD PRESSURE: 74 MMHG | HEIGHT: 69 IN

## 2019-05-01 DIAGNOSIS — Z87.19 S/P HERNIA REPAIR: ICD-10-CM

## 2019-05-01 DIAGNOSIS — N40.1 URINARY RETENTION DUE TO BENIGN PROSTATIC HYPERPLASIA: ICD-10-CM

## 2019-05-01 DIAGNOSIS — Z98.890 S/P HERNIA REPAIR: ICD-10-CM

## 2019-05-01 DIAGNOSIS — N40.0 PROSTATE HYPERTROPHY: Primary | ICD-10-CM

## 2019-05-01 DIAGNOSIS — R33.8 URINARY RETENTION DUE TO BENIGN PROSTATIC HYPERPLASIA: ICD-10-CM

## 2019-05-01 NOTE — PROGRESS NOTES
Jairo Gibson is a 75 y.o.  male and presents with Chief Complaint Patient presents with  Urinary Retention Subjective: 
Mr. Zuleima Oviedo presents to [facility] care and c/o urinary retention and has patel catheter in place. He has follow up with urology; he denies pain or burning. He denies rash at site of patel bag attachment. ROS General ROS: negative for - chills or fever Psychological ROS: negative for - anxiety or depression Ophthalmic ROS: negative for - blurry vision ENT ROS: negative for - headaches or nasal congestion Endocrine ROS: negative for - polydipsia/polyuria or temperature intolerance Respiratory ROS: no cough, shortness of breath, or wheezing Cardiovascular ROS: no chest pain or dyspnea on exertion Gastrointestinal ROS: no abdominal pain, change in bowel habits, or black or bloody stools Musculoskeletal ROS: negative for - joint pain or muscle pain Neurological ROS: negative for - numbness/tingling or weakness Dermatological ROS: negative for - pruritus or rash All other systems reviewed and are negative. Objective: 
Vitals:  
 05/01/19 1129 BP: 115/74 Pulse: 68 Resp: 16 Temp: 98.8 °F (37.1 °C) TempSrc: Oral  
Weight: 158 lb 12.8 oz (72 kg) Height: 5' 9\" (1.753 m) PainSc:   0 - No pain  
 
 
alert, well appearing, and in no distress, oriented to person, place, and time and normal appearing weight Mental status - normal mood, behavior, speech, dress, motor activity, and thought processes Chest - clear to auscultation, no wheezes, rales or rhonchi, symmetric air entry Heart - normal rate, regular rhythm, normal S1, S2, no murmurs, rubs, clicks or gallops  Male - patel catheter in place Extremities - no pedal edema noted Skin - normal coloration and turgor, no rashes, no suspicious skin lesions noted Assessment/Plan: 1. Prostate hypertrophy F/u with urology 2. Urinary retention due to benign prostatic hyperplasia 3. S/P hernia repair Lab review: labs reviewed, I note that PSA abnormal  
 
 
I have discussed the diagnosis with the patient and the intended plan as seen in the above orders. The patient has received an after-visit summary and questions were answered concerning future plans. I have discussed medication side effects and warnings with the patient as well. I have reviewed the plan of care with the patient, accepted their input and they are in agreement with the treatment goals.

## 2019-05-01 NOTE — PROGRESS NOTES
Chief Complaint Patient presents with [facility] Establish Care 1. Have you been to the ER, urgent care clinic since your last visit? Hospitalized since your last visit? No 
 
2. Have you seen or consulted any other health care providers outside of the [address] [place] [address] since your last visit? Include any pap smears or colon screening.  No

## 2019-08-05 ENCOUNTER — HOSPITAL ENCOUNTER (OUTPATIENT)
Dept: LAB | Age: 64
Discharge: HOME | End: 2019-08-05
Payer: MEDICARE

## 2019-08-05 ENCOUNTER — OFFICE VISIT (OUTPATIENT)
Dept: FAMILY MEDICINE CLINIC | Age: 64
End: 2019-08-05

## 2019-08-05 VITALS
TEMPERATURE: 99.1 F | HEART RATE: 71 BPM | OXYGEN SATURATION: 98 % | RESPIRATION RATE: 16 BRPM | DIASTOLIC BLOOD PRESSURE: 80 MMHG | BODY MASS INDEX: 23.25 KG/M2 | WEIGHT: 157 LBS | HEIGHT: 69 IN | SYSTOLIC BLOOD PRESSURE: 124 MMHG

## 2019-08-05 DIAGNOSIS — N40.0 PROSTATE HYPERTROPHY: ICD-10-CM

## 2019-08-05 DIAGNOSIS — Z11.59 ENCOUNTER FOR HEPATITIS C SCREENING TEST FOR LOW RISK PATIENT: ICD-10-CM

## 2019-08-05 DIAGNOSIS — Z28.21 REFUSED PNEUMOCOCCAL VACCINATION: ICD-10-CM

## 2019-08-05 DIAGNOSIS — Z00.00 MEDICARE ANNUAL WELLNESS VISIT, INITIAL: ICD-10-CM

## 2019-08-05 DIAGNOSIS — Z71.89 ADVANCE CARE PLANNING: Primary | ICD-10-CM

## 2019-08-05 DIAGNOSIS — Z28.21 REFUSED INFLUENZA VACCINE: ICD-10-CM

## 2019-08-05 LAB
CHOLEST SERPL-MCNC: 155 MG/DL
EST. AVERAGE GLUCOSE BLD GHB EST-MCNC: 120 MG/DL
HBA1C MFR BLD: 5.8 % (ref 4.2–5.6)
HDLC SERPL-MCNC: 55 MG/DL (ref 40–60)
HDLC SERPL: 2.8 {RATIO} (ref 0–5)
LDLC SERPL CALC-MCNC: 76.4 MG/DL (ref 0–100)
LIPID PROFILE,FLP: NORMAL
PSA SERPL-MCNC: 5.1 NG/ML (ref 0–4)
TRIGL SERPL-MCNC: 118 MG/DL (ref ?–150)
VLDLC SERPL CALC-MCNC: 23.6 MG/DL

## 2019-08-05 PROCEDURE — 36415 COLL VENOUS BLD VENIPUNCTURE: CPT

## 2019-08-05 PROCEDURE — 80061 LIPID PANEL: CPT

## 2019-08-05 PROCEDURE — 86803 HEPATITIS C AB TEST: CPT

## 2019-08-05 PROCEDURE — 84153 ASSAY OF PSA TOTAL: CPT

## 2019-08-05 PROCEDURE — 83036 HEMOGLOBIN GLYCOSYLATED A1C: CPT

## 2019-08-05 NOTE — PROGRESS NOTES
(AWV) The Initial Medicare Annual Wellness Exam PROGRESS NOTE    This is an Initial Medicare Annual Wellness Exam (AWV)     I have reviewed the patient's medical history in detail and updated the computerized patient record. Shelby Cameron is a 75 y.o.  male and presents for an annual wellness exam       ROS   General ROS: negative for - chills or fever  Psychological ROS: negative for - anxiety or depression  Ophthalmic ROS: negative for - blurry vision  ENT ROS: negative for - headaches or nasal congestion  Endocrine ROS: negative for - polydipsia/polyuria or temperature intolerance  Respiratory ROS: no cough, shortness of breath, or wheezing  Cardiovascular ROS: no chest pain or dyspnea on exertion  Gastrointestinal ROS: no abdominal pain, change in bowel habits, or black or bloody stools  Musculoskeletal ROS: negative for - joint pain or muscle pain  Neurological ROS: negative for - numbness/tingling or weakness  Dermatological ROS: negative for - pruritus or rash    All other systems reviewed and are negative. History     Past Medical History:   Diagnosis Date    Arthritis     Varicose vein of leg     LEFT LEG      Past Surgical History:   Procedure Laterality Date    HX COLONOSCOPY      HX HERNIA REPAIR      right side inguinal hernia     HX OTHER SURGICAL  1986    sebaceous cyst removed from back     Current Outpatient Medications   Medication Sig Dispense Refill    tamsulosin (FLOMAX) 0.4 mg capsule Take 1 Cap by mouth daily (after dinner). 90 Cap 3    ciprofloxacin HCl (CIPRO) 500 mg tablet Take 1 Tab by mouth two (2) times a day. 1 Tab 0     No Known Allergies  No family history on file.   Social History     Tobacco Use    Smoking status: Never Smoker    Smokeless tobacco: Never Used   Substance Use Topics    Alcohol use: Yes     Frequency: Never     Comment: BEER OR WINE OCCAS     Patient Active Problem List   Diagnosis Code    Family hx of prostate cancer Z80.42       Health Maintenance History  Immunizations reviewed, dtap up to date , pneumovax due, flu due, zoster due  colonoscopy:due,   Eye exam: due    Depression Risk Factor Screening:      Patient Health Questionnaire (PHQ-2)   Over the last 2 weeks, how often have you been bothered by any of the following problems? · Little interest or pleasure in doing things? · Not at all. [0]  · Feeling down, depressed, or hopeless? · Not at all. [0]    Total Score: 0/6  PHQ-2 Assessment Scoring:   A score of 2 or more requires further screening with the PHQ-9    Alcohol Risk Factor Screening:     Men: On any occasion during the past 3 months, have you had more than 4 drinks containing alcohol? no  Do you average more than 14 drinks per week? no    Functional Ability and Level of Safety:     Hearing Loss    Hearing is good. Activities of Daily Living   Self-care. Requires assistance with: no ADLs    Fall Risk   No fall risk factors    Abuse Screen   Patient is not abused    Examination   Physical Examination  Vitals:    08/05/19 1040   BP: 124/80   Pulse: 71   Resp: 16   Temp: 99.1 °F (37.3 °C)   TempSrc: Oral   SpO2: 98%   Weight: 157 lb (71.2 kg)   Height: 5' 9\" (1.753 m)   PainSc:   0 - No pain      Body mass index is 23.18 kg/m².      Evaluation of Cognitive Function:  Mood/affect:good mood with appropriate affect  Appearance: well kempt  Family member/caregiver input: n/a    alert, well appearing, and in no distress, oriented to person, place, and time and normal appearing weight    Patient Care Team:  Lkuas Yoon MD as PCP - General (Family Practice)  Perry Salcedo MD (Surgery)    End-of-life planning  Advanced Directive in the case than an injury or illness causes the patient to be unable to make health care decisions    Health Care Directive or Living Will: no    Advice/Referrals/Counselling/Plan:   Education and counseling provided:  Are appropriate based on today's review and evaluation  End-of-Life planning (with patient's consent)  Pneumococcal Vaccine  Diabetes screening test  shingles  Include in education list (weight loss, physical activity, smoking cessation, fall prevention, and nutrition)    ICD-10-CM ICD-9-CM    1. Advance care planning Z71.89 V65.49 ADVANCE CARE PLANNING FIRST 30 MINS   2. Medicare annual wellness visit, initial Z00.00 V70.0 LIPID PANEL      HEMOGLOBIN A1C WITH EAG      [place] DEPRESSION SCREEN ANNUAL   3. Refused pneumococcal vaccination Z28.21 V64.06    4. Refused influenza vaccine Z28.21 V64.06    5. Prostate hypertrophy N40.0 600.90 PSA, DIAGNOSTIC (PROSTATE SPECIFIC AG)   6. Encounter for hepatitis C screening test for low risk patient Z11.59 V73.89 HEPATITIS C AB   . Brief written plan, checklist    I have discussed the diagnosis with the patient and the intended plan as seen in the above orders. The patient has received an after-visit summary and questions were answered concerning future plans. I have discussed medication side effects and warnings with the patient as well. I have reviewed the plan of care with the patient, accepted their input and they are in agreement with the treatment goals. ____________________________________________________________    Problem Assessment    for treatment of   Chief Complaint   Patient presents with    Benign Prostatic Hypertrophy         SUBJECTIVE    Well Adult Physical   Patient here for a comprehensive physical exam.The patient reports problems - BPH with LUTS,   Do you take any herbs or supplements that were not prescribed by a doctor? yes Are you taking calcium supplements? no Are you taking aspirin daily? not applicable    Visit Vitals  /80 (BP 1 Location: Left arm, BP Patient Position: Sitting)   Pulse 71   Temp 99.1 °F (37.3 °C) (Oral)   Resp 16   Ht 5' 9\" (1.753 m)   Wt 157 lb (71.2 kg)   SpO2 98%   BMI 23.18 kg/m²     General:  Alert, cooperative, no distress, appears stated age.    Head:  Normocephalic, without obvious abnormality, atraumatic. Eyes:  Conjunctivae/corneas clear. PERRL, EOMs intact. Fundi benign   Ears:  Normal TMs and external ear canals both ears. Nose: Nares normal. Septum midline. Mucosa normal. No drainage or sinus tenderness. Throat: Lips, mucosa, and tongue normal. Teeth and gums normal.   Neck: Supple, symmetrical, trachea midline, no adenopathy, thyroid: no enlargement/tenderness/nodules, no carotid bruit and no JVD. Back:   Symmetric, no curvature. ROM normal. No CVA tenderness. Lungs:   Clear to auscultation bilaterally. Chest wall:  No tenderness or deformity. Heart:  Regular rate and rhythm, S1, S2 normal, no murmur, click, rub or gallop. Abdomen:   Soft, non-tender. Bowel sounds normal. No masses,  No organomegaly. Genitalia:  deferred   Rectal:  deferred   Extremities: Extremities normal, atraumatic, no cyanosis or edema. Pulses: 2+ and symmetric all extremities. Skin: Skin color, texture, turgor normal. No rashes or lesions   Lymph nodes: Cervical, supraclavicular, and axillary nodes normal.   Neurologic: CNII-XII intact. Normal strength, sensation and reflexes throughout. Assessment/Plan:    1. Advance care planning  See ACP  - ADVANCE CARE PLANNING FIRST 30 MINS    2. Medicare annual wellness visit, initial  Reviewed preventive recommendations  - LIPID PANEL; Future  - HEMOGLOBIN A1C WITH EAG; Future  - [place] DEPRESSION SCREEN ANNUAL    3. Refused pneumococcal vaccination      4. Refused influenza vaccine      5. Prostate hypertrophy    - PSA, DIAGNOSTIC (PROSTATE SPECIFIC AG); Future    6. Encounter for hepatitis C screening test for low risk patient    - HEPATITIS C AB; Future    Lab review: orders written for new lab studies as appropriate; see orders    .

## 2019-08-05 NOTE — PATIENT INSTRUCTIONS
Advance Directives: Care Instructions Your Care Instructions An advance directive is a legal way to state your wishes at the end of your life. It tells your family and your doctor what to do if you can no longer say what you want. There are two main types of advance directives. You can change them any time that your wishes change. · A living will tells your family and your doctor your wishes about life support and other treatment. · A durable power of  for health care lets you name a person to make treatment decisions for you when you can't speak for yourself. This person is called a health care agent. If you do not have an advance directive, decisions about your medical care may be made by a doctor or a  who doesn't know you. It may help to think of an advance directive as a gift to the people who care for you. If you have one, they won't have to make tough decisions by themselves. Follow-up care is a key part of your treatment and safety. Be sure to make and go to all appointments, and call your doctor if you are having problems. It's also a good idea to know your test results and keep a list of the medicines you take. How can you care for yourself at home? · Discuss your wishes with your loved ones and your doctor. This way, there are no surprises. · Many states have a unique form. Or you might use a universal form that has been approved by many states. This kind of form can sometimes be completed and stored online. Your electronic copy will then be available wherever you have a connection to the Internet. In most cases, doctors will respect your wishes even if you have a form from a different state. · You don't need a  to do an advance directive. But you may want to get legal advice. · Think about these questions when you prepare an advance directive: 
?  Who do you want to make decisions about your medical care if you are not able to? Many people choose a family member or close friend. ? Do you know enough about life support methods that might be used? If not, talk to your doctor so you understand. ? What are you most afraid of that might happen? You might be afraid of having pain, losing your independence, or being kept alive by machines. ? Where would you prefer to die? Choices include your home, a hospital, or a nursing home. ? Would you like to have information about hospice care to support you and your family? ? Do you want to donate organs when you die? ? Do you want certain Sabianist practices performed before you die? If so, put your wishes in the advance directive. · Read your advance directive every year, and make changes as needed. When should you call for help? Be sure to contact your doctor if you have any questions. Where can you learn more? Go to http://mario-basil.info/. Enter R264 in the search box to learn more about \"Advance Directives: Care Instructions. \" Current as of: April 1, 2019 Content Version: 12.1 © 0435-4633 [employer]wise[employer]orporated. Care instructions adapted under license by [employer] (which disclaims liability or warranty for this information). If you have questions about a medical condition or this instruction, always ask your healthcare professional. [address] any warranty or liability for your use of this information. Well Visit, Over 72: Care Instructions Your Care Instructions Physical exams can help you stay healthy. Your doctor has checked your overall health and may have suggested ways to take good care of yourself. He or she also may have recommended tests. At home, you can help prevent illness with healthy eating, regular exercise, and other steps. Follow-up care is a key part of your treatment and safety.  Be sure to make and go to all appointments, and call your doctor if you are having problems. It's also a good idea to know your test results and keep a list of the medicines you take. How can you care for yourself at home? · Reach and stay at a healthy weight. This will lower your risk for many problems, such as obesity, diabetes, heart disease, and high blood pressure. · Get at least 30 minutes of exercise on most days of the week. Walking is a good choice. You also may want to do other activities, such as running, swimming, cycling, or playing tennis or team sports. · Do not smoke. Smoking can make health problems worse. If you need help quitting, talk to your doctor about stop-smoking programs and medicines. These can increase your chances of quitting for good. · Protect your skin from too much sun. When you're outdoors from 10 a.m. to 4 p.m., stay in the shade or cover up with clothing and a hat with a wide brim. Wear sunglasses that block UV rays. Even when it's cloudy, put broad-spectrum sunscreen (SPF 30 or higher) on any exposed skin. · See a dentist one or two times a year for checkups and to have your teeth cleaned. · Wear a seat belt in the car. · Limit alcohol to 2 drinks a day for men and 1 drink a day for women. Too much alcohol can cause health problems. Follow your doctor's advice about when to have certain tests. These tests can spot problems early. For men and women · Cholesterol. Your doctor will tell you how often to have this done based on your overall health and other things that can increase your risk for heart attack and stroke. · Blood pressure. Have your blood pressure checked during a routine doctor visit. Your doctor will tell you how often to check your blood pressure based on your age, your blood pressure results, and other factors. · Diabetes. Ask your doctor whether you should have tests for diabetes. · Vision. Experts recommend that you have yearly exams for glaucoma and other age-related eye problems. · Hearing. Tell your doctor if you notice any change in your hearing. You can have tests to find out how well you hear. · Colon cancer tests. Keep having colon cancer tests as your doctor recommends. You can have one of several types of tests. · Heart attack and stroke risk. At least every 4 to 6 years, you should have your risk for heart attack and stroke assessed. Your doctor uses factors such as your age, blood pressure, cholesterol, and whether you smoke or have diabetes to show what your risk for a heart attack or stroke is over the next 10 years. · Osteoporosis. Talk to your doctor about whether you should have a bone density test to find out whether you have thinning bones. Also ask your doctor about whether you should take calcium and vitamin D supplements. For women · Pap test and pelvic exam. You may no longer need a Pap test. Talk with your doctor about whether to stop or continue to have Pap tests. · Breast exam and mammogram. Ask how often you should have a mammogram, which is an X-ray of your breasts. A mammogram can spot breast cancer before it can be felt and when it is easiest to treat. · Thyroid disease. Talk to your doctor about whether to have your thyroid checked as part of a regular physical exam. Women have an increased chance of a thyroid problem. For men · Prostate exam. Talk to your doctor about whether you should have a blood test (called a PSA test) for prostate cancer. Experts recommend that you discuss the benefits and risks of the test with your doctor before you decide whether to have this test. Some experts say that men ages 79 and older no longer need testing. · Abdominal aortic aneurysm. Ask your doctor whether you should have a test to check for an aneurysm. You may need a test if you ever smoked or if your parent, brother, sister, or child has had an aneurysm. When should you call for help?  
Watch closely for changes in your health, and be sure to contact your doctor if you have any problems or symptoms that concern you. Where can you learn more? Go to http://mario-basil.info/. Enter I660 in the search box to learn more about \"Well Visit, Over 65: Care Instructions. \" Current as of: December 13, 2018 Content Version: 12.1 © 6684-5706 Healthwise, Incorporated. Care instructions adapted under license by [employer] (which disclaims liability or warranty for this information). If you have questions about a medical condition or this instruction, always ask your healthcare professional. [address] any warranty or liability for your use of this information.

## 2019-08-05 NOTE — PROGRESS NOTES
Chief Complaint   Patient presents with    Benign Prostatic Hypertrophy     1. Have you been to the ER, urgent care clinic since your last visit? Hospitalized since your last visit? No    2. Have you seen or consulted any other health care providers outside of the [address] [place] [address] since your last visit? Include any pap smears or colon screening.  No

## 2019-08-06 LAB
HCV AB SER IA-ACNC: 0.02 INDEX
HCV AB SERPL QL IA: NEGATIVE
HCV COMMENT,HCGAC: NORMAL

## 2019-10-18 PROBLEM — R39.12 WEAK URINARY STREAM: Status: ACTIVE | Noted: 2019-10-18

## 2020-04-01 ENCOUNTER — TELEPHONE (OUTPATIENT)
Dept: FAMILY MEDICINE CLINIC | Age: 65
End: 2020-04-01

## 2020-04-01 ENCOUNTER — HOSPITAL ENCOUNTER (OUTPATIENT)
Dept: LAB | Age: 65
Discharge: HOME | End: 2020-04-01
Payer: MEDICARE

## 2020-04-01 LAB
ALBUMIN SERPL-MCNC: 4.2 G/DL (ref 3.4–5)
ALBUMIN/GLOB SERPL: 1 {RATIO} (ref 0.8–1.7)
ALP SERPL-CCNC: 69 U/L (ref 45–117)
ALT SERPL-CCNC: 28 U/L (ref 16–61)
ANION GAP SERPL CALC-SCNC: 5 MMOL/L (ref 3–18)
AST SERPL-CCNC: 20 U/L (ref 10–38)
BILIRUB SERPL-MCNC: 0.3 MG/DL (ref 0.2–1)
BUN SERPL-MCNC: 22 MG/DL (ref 7–18)
BUN/CREAT SERPL: 24 (ref 12–20)
CALCIUM SERPL-MCNC: 9.2 MG/DL (ref 8.5–10.1)
CHLORIDE SERPL-SCNC: 103 MMOL/L (ref 100–111)
CHOLEST SERPL-MCNC: 173 MG/DL
CO2 SERPL-SCNC: 29 MMOL/L (ref 21–32)
CREAT SERPL-MCNC: 0.93 MG/DL (ref 0.6–1.3)
ERYTHROCYTE [DISTWIDTH] IN BLOOD BY AUTOMATED COUNT: 12.6 % (ref 11.6–14.5)
GLOBULIN SER CALC-MCNC: 4.1 G/DL (ref 2–4)
GLUCOSE SERPL-MCNC: 98 MG/DL (ref 74–99)
HCT VFR BLD AUTO: 44.4 % (ref 36–48)
HDLC SERPL-MCNC: 51 MG/DL (ref 40–60)
HDLC SERPL: 3.4 {RATIO} (ref 0–5)
HGB BLD-MCNC: 14.9 G/DL (ref 13–16)
LDLC SERPL CALC-MCNC: 83.4 MG/DL (ref 0–100)
LH SERPL-ACNC: 1.5 MIU/ML
LIPID PROFILE,FLP: ABNORMAL
MCH RBC QN AUTO: 30 PG (ref 24–34)
MCHC RBC AUTO-ENTMCNC: 33.6 G/DL (ref 31–37)
MCV RBC AUTO: 89.5 FL (ref 74–97)
PLATELET # BLD AUTO: 309 K/UL (ref 135–420)
PMV BLD AUTO: 9.8 FL (ref 9.2–11.8)
POTASSIUM SERPL-SCNC: 4.4 MMOL/L (ref 3.5–5.5)
PROT SERPL-MCNC: 8.3 G/DL (ref 6.4–8.2)
RBC # BLD AUTO: 4.96 M/UL (ref 4.7–5.5)
SODIUM SERPL-SCNC: 137 MMOL/L (ref 136–145)
TRIGL SERPL-MCNC: 193 MG/DL (ref ?–150)
VLDLC SERPL CALC-MCNC: 38.6 MG/DL
WBC # BLD AUTO: 7.1 K/UL (ref 4.6–13.2)

## 2020-04-01 PROCEDURE — 83002 ASSAY OF GONADOTROPIN (LH): CPT

## 2020-04-01 PROCEDURE — 80061 LIPID PANEL: CPT

## 2020-04-01 PROCEDURE — 80053 COMPREHEN METABOLIC PANEL: CPT

## 2020-04-01 PROCEDURE — 84403 ASSAY OF TOTAL TESTOSTERONE: CPT

## 2020-04-01 PROCEDURE — 36415 COLL VENOUS BLD VENIPUNCTURE: CPT

## 2020-04-01 PROCEDURE — 85027 COMPLETE CBC AUTOMATED: CPT

## 2020-04-01 NOTE — TELEPHONE ENCOUNTER
Patient called and was transferred to me by [facility] due to being aggressive and requested to speak to me. Pt was requesting to get a bloodwork in the office. Pt was informed that the lab he was requesting to be done was not ordered by Dr. Kev Chino. Pt was also informed that we are not seeing patient in the office at the moment. Pt became very confusing to talk to and started talking about off topic. Pt started talking about 60 mins show then talked about he doesn't want to be charge double from the actual price of his lab. I apologize to the patient and informed him that he would have to talk to his insurance. Pt started talking about off topic again and mentioned about Carla Trevino then stated asked if I know about [employer]. Pt continued to talk off topic then stated that he will give the  a 1 star because they dont listen to him.

## 2020-04-03 LAB
TESTOST FREE SERPL-MCNC: 4.7 PG/ML (ref 6.6–18.1)
TESTOST SERPL-MCNC: 319 NG/DL (ref 264–916)

## 2021-10-15 PROBLEM — R39.12 WEAK URINARY STREAM: Status: RESOLVED | Noted: 2019-10-18 | Resolved: 2021-10-15

## (undated) DEVICE — COLUMN DRAPE

## (undated) DEVICE — SUTURE MCRYL SZ 4-0 L18IN ABSRB UD L19MM PS-2 3/8 CIR PRIM Y496G

## (undated) DEVICE — REM POLYHESIVE ADULT PATIENT RETURN ELECTRODE: Brand: VALLEYLAB

## (undated) DEVICE — Device

## (undated) DEVICE — BLADE ASSEMB CLP HAIR FINE --

## (undated) DEVICE — SHEET,DRAPE,70X100,STERILE: Brand: MEDLINE

## (undated) DEVICE — INTENDED FOR TISSUE SEPARATION, AND OTHER PROCEDURES THAT REQUIRE A SHARP SURGICAL BLADE TO PUNCTURE OR CUT.: Brand: BARD-PARKER ® CARBON RIB-BACK BLADES

## (undated) DEVICE — LIGHT HANDLE: Brand: DEVON

## (undated) DEVICE — SUTURE VCRL SZ 2-0 L27IN ABSRB UD L26MM SH 1/2 CIR J417H

## (undated) DEVICE — STERILE POLYISOPRENE POWDER-FREE SURGICAL GLOVES: Brand: PROTEXIS

## (undated) DEVICE — ANTI-FOG SOLUTION WITH FOAM PAD: Brand: DEVON

## (undated) DEVICE — ARM DRAPE

## (undated) DEVICE — KENDALL SCD EXPRESS SLEEVES, KNEE LENGTH, MEDIUM: Brand: KENDALL SCD

## (undated) DEVICE — PREP SKN CHLRAPRP 26ML TNT -- CONVERT TO ITEM 373320

## (undated) DEVICE — SOL IRR NACL 0.9% 500ML POUR --

## (undated) DEVICE — BLADELESS OBTURATOR: Brand: WECK VISTA

## (undated) DEVICE — DERMABOND SKIN ADH 0.7ML -- DERMABOND ADVANCED 12/BX

## (undated) DEVICE — SEAL UNIV 5-8MM DISP BX/10 -- DA VINCI XI - SNGL USE